# Patient Record
Sex: FEMALE | Race: WHITE | Employment: FULL TIME | URBAN - METROPOLITAN AREA
[De-identification: names, ages, dates, MRNs, and addresses within clinical notes are randomized per-mention and may not be internally consistent; named-entity substitution may affect disease eponyms.]

---

## 2017-01-12 ENCOUNTER — TELEPHONE (OUTPATIENT)
Dept: FAMILY MEDICINE CLINIC | Age: 27
End: 2017-01-12

## 2017-01-12 DIAGNOSIS — F17.200 SMOKING: ICD-10-CM

## 2017-01-12 DIAGNOSIS — F41.8 DEPRESSION WITH ANXIETY: ICD-10-CM

## 2017-01-12 DIAGNOSIS — F31.4 BIPOLAR DISORDER, CURRENT EPISODE DEPRESSED, SEVERE, WITHOUT PSYCHOTIC FEATURES (HCC): ICD-10-CM

## 2017-01-12 RX ORDER — TRAZODONE HYDROCHLORIDE 50 MG/1
50 TABLET ORAL NIGHTLY PRN
Qty: 30 TABLET | Refills: 5 | Status: ON HOLD | OUTPATIENT
Start: 2017-01-12 | End: 2017-06-29 | Stop reason: HOSPADM

## 2017-01-12 RX ORDER — BUPROPION HYDROCHLORIDE 150 MG/1
150 TABLET, EXTENDED RELEASE ORAL 2 TIMES DAILY
Qty: 60 TABLET | Refills: 5 | Status: ON HOLD | OUTPATIENT
Start: 2017-01-12 | End: 2017-06-29 | Stop reason: HOSPADM

## 2017-01-12 RX ORDER — PAROXETINE HYDROCHLORIDE 20 MG/1
20 TABLET, FILM COATED ORAL DAILY
Qty: 30 TABLET | Refills: 5 | Status: ON HOLD | OUTPATIENT
Start: 2017-01-12 | End: 2017-06-29 | Stop reason: HOSPADM

## 2017-01-12 RX ORDER — QUETIAPINE FUMARATE 50 MG/1
50 TABLET, FILM COATED ORAL NIGHTLY
Qty: 30 TABLET | Refills: 5 | Status: ON HOLD | OUTPATIENT
Start: 2017-01-12 | End: 2017-06-29 | Stop reason: HOSPADM

## 2017-01-12 RX ORDER — LAMOTRIGINE 25 MG/1
25 TABLET ORAL DAILY
Qty: 30 TABLET | Refills: 5 | Status: ON HOLD | OUTPATIENT
Start: 2017-01-12 | End: 2017-06-29 | Stop reason: HOSPADM

## 2017-07-03 ENCOUNTER — TELEPHONE (OUTPATIENT)
Dept: FAMILY MEDICINE CLINIC | Age: 27
End: 2017-07-03

## 2017-07-05 ENCOUNTER — TELEPHONE (OUTPATIENT)
Dept: FAMILY MEDICINE CLINIC | Age: 27
End: 2017-07-05

## 2017-11-08 ENCOUNTER — OFFICE VISIT (OUTPATIENT)
Dept: FAMILY MEDICINE CLINIC | Age: 27
End: 2017-11-08
Payer: MEDICAID

## 2017-11-08 VITALS
RESPIRATION RATE: 16 BRPM | SYSTOLIC BLOOD PRESSURE: 122 MMHG | BODY MASS INDEX: 40.05 KG/M2 | HEART RATE: 80 BPM | WEIGHT: 234.6 LBS | DIASTOLIC BLOOD PRESSURE: 76 MMHG | HEIGHT: 64 IN

## 2017-11-08 DIAGNOSIS — F41.8 DEPRESSION WITH ANXIETY: Primary | ICD-10-CM

## 2017-11-08 PROCEDURE — 1036F TOBACCO NON-USER: CPT | Performed by: NURSE PRACTITIONER

## 2017-11-08 PROCEDURE — 99213 OFFICE O/P EST LOW 20 MIN: CPT | Performed by: NURSE PRACTITIONER

## 2017-11-08 PROCEDURE — G8417 CALC BMI ABV UP PARAM F/U: HCPCS | Performed by: NURSE PRACTITIONER

## 2017-11-08 PROCEDURE — G8427 DOCREV CUR MEDS BY ELIG CLIN: HCPCS | Performed by: NURSE PRACTITIONER

## 2017-11-08 PROCEDURE — G8484 FLU IMMUNIZE NO ADMIN: HCPCS | Performed by: NURSE PRACTITIONER

## 2017-11-08 NOTE — PROGRESS NOTES
Subjective:      Patient ID: Yasmani Ni is a 24 y.o. female. HPI: Acute for mood    Chief Complaint   Patient presents with    Mood Swings     bipolar, needs to restart medications       Patient is postpartum 1 week. Hx of Bipolar. Stable on Lamictal, Seroquel and Trazodone. Was taken off when she was pregnant - had withdrawal and was admitted to [facility] floor due to suicidal ideation. Was placed on Prozac for pregnancy but did not continued    Presents today feeling pretty good even with . This is her first child. Not . She will be moving to [facility] in coming weeks for new job. Will be going back to work soon. Family and boyfriend (infants father) will be close by. Not following with [facility]    Would like to get pregnant again so does not want treatment she will have to come off of. Patient Active Problem List   Diagnosis    PCOS (polycystic ovarian syndrome)    Depression with anxiety    Bipolar affective disorder, current episode depressed ([facility]    Episode of recurrent major depressive disorder ([facility]    Affective psychosis, bipolar ([facility]         Review of Systems   Constitutional: Negative for chills and fever. HENT: Negative. Respiratory: Negative for cough and shortness of breath. Cardiovascular: Negative for chest pain. Gastrointestinal: Negative for abdominal pain and nausea. Skin: Negative for rash. Neurological: Negative for dizziness, light-headedness and headaches. Psychiatric/Behavioral: Positive for behavioral problems and dysphoric mood. Negative for self-injury, sleep disturbance and suicidal ideas. The patient is nervous/anxious. Objective:   Physical Exam   Constitutional: She is oriented to person, place, and time. Vital signs are normal. She appears well-developed and well-nourished. She is active. She does not have a sickly appearance. No distress.    HENT:   Right Ear: Tympanic membrane normal.   Left Ear: Tympanic membrane normal. Nose: Nose normal.   Mouth/Throat: Oropharynx is clear and moist and mucous membranes are normal.   Cardiovascular: Normal rate, regular rhythm, S1 normal, S2 normal, normal heart sounds and normal pulses. Exam reveals no S3. No murmur heard. Pulmonary/Chest: Effort normal and breath sounds normal. She has no decreased breath sounds. She has no wheezes. She has no rhonchi. Abdominal: Soft. Bowel sounds are normal. There is no tenderness. Neurological: She is alert and oriented to person, place, and time. Psychiatric: Her speech is normal and behavior is normal. Thought content normal. Her mood appears anxious. She is not agitated, not slowed and not withdrawn. Cognition and memory are not impaired. She expresses impulsivity. She does not exhibit a depressed mood. Assessment:      1. Depression with anxiety  sertraline (ZOLOFT) 50 MG tablet   2.  Postpartum state  sertraline (ZOLOFT) 50 MG tablet           Plan:      Discussion on tx - discouraged PRN use especially with   Even though patient doing well now - concern relayed with moving, new job, hx and postpartum period puts her at high risk  Zoloft 50 mg  RTO in 1 month

## 2017-11-09 ASSESSMENT — ENCOUNTER SYMPTOMS
ABDOMINAL PAIN: 0
COUGH: 0
SHORTNESS OF BREATH: 0
NAUSEA: 0

## 2018-09-17 ENCOUNTER — TELEPHONE (OUTPATIENT)
Dept: FAMILY MEDICINE CLINIC | Age: 28
End: 2018-09-17

## 2018-09-17 NOTE — TELEPHONE ENCOUNTER
Patient presented at window of office. No openings. Recommended urgent care. Patient voiced understanding.

## 2018-09-17 NOTE — TELEPHONE ENCOUNTER
09/17/2018 Patient called office asking to be seen today. Yesterday felt like she was going to pass out. Nauseated,  Called into work this morning because she felt like she was going to pass out again. Patient does not know if she has vertigo or what. Please advise patient at [phone]. da

## 2018-10-15 ENCOUNTER — OFFICE VISIT (OUTPATIENT)
Dept: FAMILY MEDICINE CLINIC | Age: 28
End: 2018-10-15
Payer: COMMERCIAL

## 2018-10-15 VITALS
WEIGHT: 238 LBS | OXYGEN SATURATION: 99 % | HEART RATE: 67 BPM | DIASTOLIC BLOOD PRESSURE: 88 MMHG | HEIGHT: 64 IN | BODY MASS INDEX: 40.63 KG/M2 | SYSTOLIC BLOOD PRESSURE: 132 MMHG | RESPIRATION RATE: 16 BRPM

## 2018-10-15 DIAGNOSIS — T75.4XXA ELECTROCUTION AND NONFATAL EFFECTS OF ELECTRIC CURRENT, INITIAL ENCOUNTER: Primary | ICD-10-CM

## 2018-10-15 DIAGNOSIS — M79.10 MUSCLE PAIN: ICD-10-CM

## 2018-10-15 PROCEDURE — G8484 FLU IMMUNIZE NO ADMIN: HCPCS | Performed by: NURSE PRACTITIONER

## 2018-10-15 PROCEDURE — 99213 OFFICE O/P EST LOW 20 MIN: CPT | Performed by: NURSE PRACTITIONER

## 2018-10-15 PROCEDURE — 4004F PT TOBACCO SCREEN RCVD TLK: CPT | Performed by: NURSE PRACTITIONER

## 2018-10-15 PROCEDURE — G8417 CALC BMI ABV UP PARAM F/U: HCPCS | Performed by: NURSE PRACTITIONER

## 2018-10-15 PROCEDURE — G8427 DOCREV CUR MEDS BY ELIG CLIN: HCPCS | Performed by: NURSE PRACTITIONER

## 2018-10-15 RX ORDER — TIZANIDINE 2 MG/1
2 TABLET ORAL EVERY 8 HOURS PRN
Qty: 30 TABLET | Refills: 0 | Status: SHIPPED | OUTPATIENT
Start: 2018-10-15 | End: 2019-06-17

## 2018-10-15 RX ORDER — GABAPENTIN 100 MG/1
100 CAPSULE ORAL 3 TIMES DAILY PRN
Qty: 30 CAPSULE | Refills: 0 | Status: SHIPPED | OUTPATIENT
Start: 2018-10-15 | End: 2019-06-17

## 2018-10-15 ASSESSMENT — PATIENT HEALTH QUESTIONNAIRE - PHQ9
1. LITTLE INTEREST OR PLEASURE IN DOING THINGS: 0
SUM OF ALL RESPONSES TO PHQ9 QUESTIONS 1 & 2: 0
SUM OF ALL RESPONSES TO PHQ QUESTIONS 1-9: 0
SUM OF ALL RESPONSES TO PHQ QUESTIONS 1-9: 0
2. FEELING DOWN, DEPRESSED OR HOPELESS: 0

## 2018-10-15 NOTE — LETTER
[address] [place] [address].  [address] [facility] [place] [address]  Phone: [phone]  Fax: [phone]    JAMESON Crenshaw CNP        October 15, 2018     Patient: Andres Valenzuela   YOB: 1990   Date of Visit: 10/15/2018       To Whom it May Concern:    Ed Hollingsworth was seen in my clinic on 10/15/2018. She may return to work on 10/16/18. If you have any questions or concerns, please don't hesitate to call.     Sincerely,         JAMESON Crenshaw CNP

## 2018-10-15 NOTE — PATIENT INSTRUCTIONS
respiratory therapist.  The four appointments span over three weeks. The respiratory therapist schedules one of the appointments to occur 48 hours after the patients quit date.  Cost $100 total for the four sessions.   Tobacco cessation products are not included in the cost and are not provided by [facility].     Marylu Hamman

## 2018-10-16 ENCOUNTER — TELEPHONE (OUTPATIENT)
Dept: FAMILY MEDICINE CLINIC | Age: 28
End: 2018-10-16

## 2018-10-16 ASSESSMENT — ENCOUNTER SYMPTOMS
SHORTNESS OF BREATH: 0
NAUSEA: 0
ABDOMINAL PAIN: 0
COUGH: 0

## 2019-06-17 ENCOUNTER — TELEPHONE (OUTPATIENT)
Dept: FAMILY MEDICINE CLINIC | Age: 29
End: 2019-06-17

## 2019-06-17 ENCOUNTER — HOSPITAL ENCOUNTER (INPATIENT)
Age: 29
LOS: 4 days | Discharge: HOME | DRG: 885 | End: 2019-06-21
Attending: FAMILY MEDICINE | Admitting: PSYCHIATRY & NEUROLOGY
Payer: COMMERCIAL

## 2019-06-17 DIAGNOSIS — F33.9 EPISODE OF RECURRENT MAJOR DEPRESSIVE DISORDER, UNSPECIFIED DEPRESSION EPISODE SEVERITY (HCC): Primary | ICD-10-CM

## 2019-06-17 PROBLEM — F32.9 MAJOR DEPRESSIVE DISORDER WITHOUT PSYCHOTIC FEATURES: Status: ACTIVE | Noted: 2019-06-17

## 2019-06-17 LAB
ACETAMINOPHEN LEVEL: < 5 UG/ML (ref 0–20)
AMPHETAMINE+METHAMPHETAMINE URINE SCREEN: NEGATIVE
ANION GAP SERPL CALCULATED.3IONS-SCNC: 13 MEQ/L (ref 8–16)
BARBITURATE QUANTITATIVE URINE: NEGATIVE
BASOPHILS # BLD: 0.5 %
BASOPHILS ABSOLUTE: 0 THOU/MM3 (ref 0–0.1)
BENZODIAZEPINE QUANTITATIVE URINE: NEGATIVE
BUN BLDV-MCNC: 13 MG/DL (ref 7–22)
CALCIUM SERPL-MCNC: 9.1 MG/DL (ref 8.5–10.5)
CANNABINOID QUANTITATIVE URINE: NEGATIVE
CHLORIDE BLD-SCNC: 105 MEQ/L (ref 98–111)
CO2: 20 MEQ/L (ref 23–33)
COCAINE METABOLITE QUANTITATIVE URINE: NEGATIVE
CREAT SERPL-MCNC: 0.7 MG/DL (ref 0.4–1.2)
EOSINOPHIL # BLD: 1.6 %
EOSINOPHILS ABSOLUTE: 0.1 THOU/MM3 (ref 0–0.4)
ERYTHROCYTE [DISTWIDTH] IN BLOOD BY AUTOMATED COUNT: 12.7 % (ref 11.5–14.5)
ERYTHROCYTE [DISTWIDTH] IN BLOOD BY AUTOMATED COUNT: 41.9 FL (ref 35–45)
ETHYL ALCOHOL, SERUM: < 0.01 %
GFR SERPL CREATININE-BSD FRML MDRD: > 90 ML/MIN/1.73M2
GLUCOSE BLD-MCNC: 102 MG/DL (ref 70–108)
HCT VFR BLD CALC: 42.1 % (ref 37–47)
HEMOGLOBIN: 13.9 GM/DL (ref 12–16)
IMMATURE GRANS (ABS): 0.02 THOU/MM3 (ref 0–0.07)
IMMATURE GRANULOCYTES: 0.4 %
LYMPHOCYTES # BLD: 27.5 %
LYMPHOCYTES ABSOLUTE: 1.5 THOU/MM3 (ref 1–4.8)
MCH RBC QN AUTO: 30.1 PG (ref 26–33)
MCHC RBC AUTO-ENTMCNC: 33 GM/DL (ref 32.2–35.5)
MCV RBC AUTO: 91.1 FL (ref 81–99)
MONOCYTES # BLD: 16.7 %
MONOCYTES ABSOLUTE: 0.9 THOU/MM3 (ref 0.4–1.3)
NUCLEATED RED BLOOD CELLS: 0 /100 WBC
OPIATES, URINE: NEGATIVE
OSMOLALITY CALCULATION: 276 MOSMOL/KG (ref 275–300)
OXYCODONE: NEGATIVE
PHENCYCLIDINE QUANTITATIVE URINE: NEGATIVE
PLATELET # BLD: 256 THOU/MM3 (ref 130–400)
PMV BLD AUTO: 9.3 FL (ref 9.4–12.4)
POTASSIUM SERPL-SCNC: 4.3 MEQ/L (ref 3.5–5.2)
PREGNANCY, SERUM: NEGATIVE
RBC # BLD: 4.62 MILL/MM3 (ref 4.2–5.4)
SALICYLATE, SERUM: < 0.3 MG/DL (ref 2–10)
SEG NEUTROPHILS: 53.3 %
SEGMENTED NEUTROPHILS ABSOLUTE COUNT: 3 THOU/MM3 (ref 1.8–7.7)
SODIUM BLD-SCNC: 138 MEQ/L (ref 135–145)
TSH SERPL DL<=0.05 MIU/L-ACNC: 1.38 UIU/ML (ref 0.4–4.2)
WBC # BLD: 5.6 THOU/MM3 (ref 4.8–10.8)

## 2019-06-17 PROCEDURE — 99285 EMERGENCY DEPT VISIT HI MDM: CPT

## 2019-06-17 PROCEDURE — 36415 COLL VENOUS BLD VENIPUNCTURE: CPT

## 2019-06-17 PROCEDURE — 80048 BASIC METABOLIC PNL TOTAL CA: CPT

## 2019-06-17 PROCEDURE — 6370000000 HC RX 637 (ALT 250 FOR IP): Performed by: PSYCHIATRY & NEUROLOGY

## 2019-06-17 PROCEDURE — 1240000000 HC EMOTIONAL WELLNESS R&B

## 2019-06-17 PROCEDURE — 84443 ASSAY THYROID STIM HORMONE: CPT

## 2019-06-17 PROCEDURE — 84703 CHORIONIC GONADOTROPIN ASSAY: CPT

## 2019-06-17 PROCEDURE — G0480 DRUG TEST DEF 1-7 CLASSES: HCPCS

## 2019-06-17 PROCEDURE — 80307 DRUG TEST PRSMV CHEM ANLYZR: CPT

## 2019-06-17 PROCEDURE — 85025 COMPLETE CBC W/AUTO DIFF WBC: CPT

## 2019-06-17 RX ORDER — HYDROXYZINE HYDROCHLORIDE 25 MG/1
25 TABLET, FILM COATED ORAL ONCE
Status: DISCONTINUED | OUTPATIENT
Start: 2019-06-17 | End: 2019-06-21 | Stop reason: HOSPADM

## 2019-06-17 RX ORDER — ACETAMINOPHEN 325 MG/1
650 TABLET ORAL EVERY 4 HOURS PRN
Status: DISCONTINUED | OUTPATIENT
Start: 2019-06-17 | End: 2019-06-21 | Stop reason: HOSPADM

## 2019-06-17 RX ORDER — TRAZODONE HYDROCHLORIDE 50 MG/1
50 TABLET ORAL NIGHTLY PRN
Status: DISCONTINUED | OUTPATIENT
Start: 2019-06-17 | End: 2019-06-21 | Stop reason: HOSPADM

## 2019-06-17 RX ORDER — HYDROXYZINE HYDROCHLORIDE 25 MG/1
25 TABLET, FILM COATED ORAL 3 TIMES DAILY PRN
Status: DISCONTINUED | OUTPATIENT
Start: 2019-06-17 | End: 2019-06-19

## 2019-06-17 RX ADMIN — TRAZODONE HYDROCHLORIDE 50 MG: 50 TABLET ORAL at 20:23

## 2019-06-17 RX ADMIN — HYDROXYZINE HYDROCHLORIDE 25 MG: 25 TABLET, FILM COATED ORAL at 20:23

## 2019-06-17 ASSESSMENT — SLEEP AND FATIGUE QUESTIONNAIRES
AVERAGE NUMBER OF SLEEP HOURS: 3
RESTFUL SLEEP: NO
SLEEP PATTERN: DIFFICULTY FALLING ASLEEP
DIFFICULTY STAYING ASLEEP: YES
AVERAGE NUMBER OF SLEEP HOURS: 3
DIFFICULTY FALLING ASLEEP: YES
DO YOU USE A SLEEP AID: NO
DIFFICULTY ARISING: NO
DIFFICULTY ARISING: NO
RESTFUL SLEEP: NO
DIFFICULTY FALLING ASLEEP: YES
DO YOU HAVE DIFFICULTY SLEEPING: YES
DO YOU USE A SLEEP AID: NO
SLEEP PATTERN: DIFFICULTY FALLING ASLEEP;DISTURBED/INTERRUPTED SLEEP
DO YOU HAVE DIFFICULTY SLEEPING: YES
DIFFICULTY STAYING ASLEEP: NO

## 2019-06-17 ASSESSMENT — LIFESTYLE VARIABLES
HISTORY_ALCOHOL_USE: YES
HISTORY_ALCOHOL_USE: NO

## 2019-06-17 ASSESSMENT — PATIENT HEALTH QUESTIONNAIRE - PHQ9
SUM OF ALL RESPONSES TO PHQ QUESTIONS 1-9: 18
SUM OF ALL RESPONSES TO PHQ QUESTIONS 1-9: 18

## 2019-06-17 ASSESSMENT — ENCOUNTER SYMPTOMS
VOMITING: 0
DIARRHEA: 0
SORE THROAT: 0
ABDOMINAL PAIN: 0
WHEEZING: 0
NAUSEA: 0
COUGH: 0
EYE PAIN: 0
SHORTNESS OF BREATH: 0
EYE DISCHARGE: 0
RHINORRHEA: 0
BACK PAIN: 0

## 2019-06-17 ASSESSMENT — PAIN SCALES - GENERAL: PAINLEVEL_OUTOF10: 0

## 2019-06-17 NOTE — ED NOTES
Patient offered food and drink. Patient has no questions or concerns. Call light in reach.      Camilla Phelps RN  06/17/19 [id number]

## 2019-06-17 NOTE — ED PROVIDER NOTES
[facility]  eMERGENCY dEPARTMENT eNCOUnter          CHIEF COMPLAINT       Chief Complaint   Patient presents with   [address] Problem    Suicidal       Nurses Notes reviewed and I agree except as noted in the HPI. HISTORY OF Edgar Galicia is a 34 y.o. female who presents to the Emergency Department with a history of bipolar disorder and type 1 manic behavior for mental health evaluation. Patient states that she has been treated for her bipolar disorder in the past. She states that she has been on and off of her medication for the past year in a half but has been completely off for the past month. She explains that within the last month her job has increased in stress and she is struggling managing her job with her personal life. Patient states that she is a single mother to a toddler son who she wants to be here for but needs to get her mental health taken care of. She reports suicidal ideation with a plan to OD on medication. She states she wants to fall asleep and stay asleep. She explains that she has tried [facility], PCP, physician at work, and called another psychiatrist 5 times without response or help. Her last suicidal attempt was November 2016 by trying to OD. Patient requests to be placed back on her medication to help manage her mental health. No further complaints or concerns at initial evaluation. The HPI was provided by the patient. REVIEW OF SYSTEMS     Review of Systems   Constitutional: Negative for appetite change, chills, fatigue and fever. HENT: Negative for congestion, ear pain, rhinorrhea and sore throat. Eyes: Negative for pain, discharge and visual disturbance. Respiratory: Negative for cough, shortness of breath and wheezing. Cardiovascular: Negative for chest pain, palpitations and leg swelling. Gastrointestinal: Negative for abdominal pain, diarrhea, nausea and vomiting.    Genitourinary: Negative for difficulty urinating, dysuria, hematuria and vaginal discharge. Musculoskeletal: Negative for arthralgias, back pain, joint swelling and neck pain. Skin: Negative for pallor and rash. Neurological: Negative for dizziness, syncope, weakness, light-headedness, numbness and headaches. Hematological: Negative for adenopathy. Psychiatric/Behavioral: Positive for dysphoric mood and suicidal ideas. Negative for confusion. The patient is not nervous/anxious. PAST MEDICALHISTORY    has a past medical history of Bipolar disorder ([facility] [address] Depression, Drug abuse ([facility] [address] [facility] Hypertension, Manic behavior ([facility] [address] [facility] and PCOS (polycystic ovarian syndrome). SURGICAL HISTORY    has a past surgical history that includes lymph node dissection and skin biopsy. CURRENT MEDICATIONS       Current Discharge Medication List          ALLERGIES     is allergic to buspar [buspirone]; latuda [lurasidone hcl]; and zofran [ondansetron hcl]. FAMILY HISTORY     indicated that her mother is alive. She indicated that her father is alive. She indicated that her sister is alive. She indicated that her brother is alive. She indicated that her maternal grandmother is alive. She indicated that her maternal grandfather is alive. She indicated that her paternal grandmother is alive. She indicated that her paternal grandfather is alive. family history includes Diabetes in her paternal grandfather and paternal grandmother; High Blood Pressure in her brother, father, maternal grandfather, maternal grandmother, mother, paternal grandfather, and paternal grandmother. SOCIAL HISTORY      reports that she quit smoking about 2 years ago. She started smoking about 10 years ago. She quit after 7.00 years of use. She has never used smokeless tobacco. She reports that she drinks alcohol. She reports that she does not use drugs. PHYSICAL EXAM     INITIAL VITALS:  height is 5' 4\" (1.626 m) and weight is 240 lb (108.9 kg). Her oral temperature is 98 °F (36.7 °C).  Her blood pressure is 124/77 and her pulse is 76. Her respiration is 16 and oxygen saturation is 98%. Physical Exam   Constitutional: She is oriented to person, place, and time. She appears well-developed and well-nourished. HENT:   Head: Normocephalic and atraumatic. Right Ear: External ear normal.   Left Ear: External ear normal.   Eyes: Conjunctivae are normal. Right eye exhibits no discharge. Left eye exhibits no discharge. No scleral icterus. Neck: Normal range of motion. Neck supple. No JVD present. Pulmonary/Chest: Effort normal. No stridor. No respiratory distress. Abdominal: Soft. She exhibits no distension. Musculoskeletal: Normal range of motion. She exhibits no edema. Neurological: She is alert and oriented to person, place, and time. She exhibits normal muscle tone. GCS eye subscore is 4. GCS verbal subscore is 5. GCS motor subscore is 6. Skin: Skin is warm and dry. She is not diaphoretic. No erythema. Psychiatric: She has a normal mood and affect. Her behavior is normal.   Nursing note and vitals reviewed.       DIFFERENTIALDIAGNOSIS:   Bipolar disorder, suicidal ideation, depression, anxiety    DIAGNOSTIC RESULTS     EKG: All EKG's are interpreted by the Emergency Department Physician who either signs or Co-signs this chart in the absence of a cardiologist.  EKG interpreted by Scott Dias MD:    None    RADIOLOGY: non-plain film images(s) such as CT, Ultrasound and MRI are read by the radiologist.    No orders to display       LABS:   Labs Reviewed   CBC WITH AUTO DIFFERENTIAL - Abnormal; Notable for the following components:       Result Value    MPV 9.3 (*)     All other components within normal limits   BASIC METABOLIC PANEL - Abnormal; Notable for the following components:    CO2 20 (*)     All other components within normal limits   SALICYLATE LEVEL - Abnormal; Notable for the following components:    Salicylate, Serum < 0.3 (*)     All other components within normal limits TSH WITH REFLEX   URINE DRUG SCREEN   ETHANOL   ACETAMINOPHEN LEVEL   HCG, SERUM, QUALITATIVE   ANION GAP   OSMOLALITY   GLOMERULAR FILTRATION RATE, ESTIMATED       EMERGENCY DEPARTMENT COURSE:   Vitals:    Vitals:    06/17/19 1400 06/17/19 1752 06/17/19 1930 06/18/19 0800   BP: (!) 144/89 119/81 122/88 124/77   Pulse: 95 84 78 76   Resp: 17 16 16 16   Temp: 98.2 °F (36.8 °C) 98.4 °F (36.9 °C) 98.1 °F (36.7 °C) 98 °F (36.7 °C)   TempSrc: Oral Oral Oral Oral   SpO2: 98% 96% 100% 98%   Weight:  240 lb (108.9 kg)     Height:  5' 4\" (1.626 m)         2:07 PM: The patient was seen and evaluated in a timely fashion. MDM:  The patient was seen and evaluated in the ED today following mental health evaluation. Within the department I observed the patient's vital signs to show normal ranges. On exam, I appreciated heart and lungs clear to ascultation. Abdomen soft and non-tender. Laboratory results showed negative drug screen. I consulted Dr. Vira Cobb (Psychiatry) who graciously agreed to admit the patient. Within the department, the patient was treated with Atarax. I explained my proposed course of treatment to the patient, who was amenable to my decision, and I answered all questions that were asked. The patient was then admitted under Dr. Vira Cobb (Psychiatry).      CRITICAL CARE:   None     CONSULTS:  JENNIFER    PROCEDURES:  None     FINAL IMPRESSION      1. Episode of recurrent major depressive disorder, unspecified depression episode severity ([facility] [address]          DISPOSITION/PLAN   Admit    PATIENT REFERRED TO:  Yelitza North, APRN - CNP  [facility] [address]  [phone]            DISCHARGE MEDICATIONS:  Current Discharge Medication List          (Please note that portions of this note were completed with a voice recognition program.Efforts were made to edit thedictations but occasionally words are mis-transcribed.)    Scribe:  Bo Cruz 6/17/19 2:07 PM Scribing forand in the presence Mely Henderson MD.    Signed by: Cady Cota, 06/18/19 12:15 PM    Provider:  I personally performed the services described in the documentation, reviewed and edited the documentation which was dictated to the scribe in my presence, and it accurately records mywords and actions.     Shonda Hernandez MD 6/17/19 12:15 PM                  Shonda Hernandez MD  06/18/19 2296

## 2019-06-17 NOTE — PROGRESS NOTES
Provisional Diagnosis:   Unspecified Depressive Disorder      Risk, Psychosocial and Contextual Factors:  Pt seeking to be placed on psychotropic medication to assist in stabilization     Current MH Treatment:  Denies      Present Suicidal Behavior:      Verbal:   X        Attempt:            Denies     Access to Weapons:   Denies     Current Suicide Risk: Low, Moderate or High:    High     Past Suicidal Behavior:       Verbal:  X    Attempt:  History of suicide attempt by overdose in 2017    Self-Injurious/Self-Mutilation:  Denies     Traumatic Event Within Past 2 Weeks:   Denies     Current Abuse:  Denies     Legal:  Denies     Violence:   Denies current violent behavior, see summary    Protective Factors:  Pts mother, aunt and son's grandmother are supportive     Housing:    Pt has stable housing     Clinical Summary:      Pt is a 34year old female presenting to [facility] voluntarily due to depression and suicidal thoughts. Pt report worsening depression and suicidal thoughts during the last 30 days. Pt report current suicidal thoughts with a plan to overdose on trazadone, pts child was her deterrent today. Pt report a history of being diagnosed with bipolar disorder, was prescribed psychotropic medication in the past, was taken off her medication while pregnant in 2017. Pt attempted to follow up with [facility] gave me the run-around\". Pts PCP did not prescribe her medication but instead referred her to [facility]. Pt has a history of cocaine and marijuana use however has been sober since 2017. Pt report occasional alcohol use. Pt works and lives with her son. Pt has a limited support system. Pt has a history of inpatient psychiatric treatment at [facility] on 11/30/16 and 6/27/17. Pt reportedly was upset while in the ED during the 6/27/17 admission, \"I threw things around cause they wouldn't let my boyfriend back, they medicated me and I slept for two days\". Pt denies current violent behavior.   Pt denies legal

## 2019-06-17 NOTE — TELEPHONE ENCOUNTER
Called and updated the patient, she stated that she went to the crisis center last time and was given the run around. The patient stated that she will go to the ED.

## 2019-06-17 NOTE — TELEPHONE ENCOUNTER
The patient called very tearful and anxious requesting an appt for her Bipolar. The patient states that she is crying all the time, not sleeping and is an emotional mess due to being off her medications. She stated that she is not able to get anywhere with [facility], she has called [facility] in [facility] and they are not calling her back. She stated that she has been off her psych meds for about a year and a half due to being pregnant and having her son. She states she can not do it anymore and does not want to go to the ED due to mik a single mom and has not place for her son. She is wanting to be seen but you do not have any openings today. She stated that she took any emergency day off today and will have to work tomorrow. Does not want to see another provider. Please advise.

## 2019-06-18 PROCEDURE — APPSS60 APP SPLIT SHARED TIME 46-60 MINUTES: Performed by: PHYSICIAN ASSISTANT

## 2019-06-18 PROCEDURE — 6370000000 HC RX 637 (ALT 250 FOR IP): Performed by: PSYCHIATRY & NEUROLOGY

## 2019-06-18 PROCEDURE — 90792 PSYCH DIAG EVAL W/MED SRVCS: CPT | Performed by: PSYCHIATRY & NEUROLOGY

## 2019-06-18 PROCEDURE — 6370000000 HC RX 637 (ALT 250 FOR IP): Performed by: PHYSICIAN ASSISTANT

## 2019-06-18 PROCEDURE — 1240000000 HC EMOTIONAL WELLNESS R&B

## 2019-06-18 RX ORDER — PAROXETINE HYDROCHLORIDE 20 MG/1
20 TABLET, FILM COATED ORAL DAILY
Status: DISCONTINUED | OUTPATIENT
Start: 2019-06-18 | End: 2019-06-21 | Stop reason: HOSPADM

## 2019-06-18 RX ORDER — QUETIAPINE FUMARATE 25 MG/1
50 TABLET, FILM COATED ORAL NIGHTLY
Status: DISCONTINUED | OUTPATIENT
Start: 2019-06-18 | End: 2019-06-19

## 2019-06-18 RX ADMIN — QUETIAPINE FUMARATE 50 MG: 25 TABLET ORAL at 21:18

## 2019-06-18 RX ADMIN — HYDROXYZINE HYDROCHLORIDE 25 MG: 25 TABLET, FILM COATED ORAL at 15:40

## 2019-06-18 RX ADMIN — HYDROXYZINE HYDROCHLORIDE 25 MG: 25 TABLET, FILM COATED ORAL at 21:18

## 2019-06-18 RX ADMIN — TRAZODONE HYDROCHLORIDE 50 MG: 50 TABLET ORAL at 21:18

## 2019-06-18 RX ADMIN — PAROXETINE HYDROCHLORIDE 20 MG: 20 TABLET, FILM COATED ORAL at 12:35

## 2019-06-18 ASSESSMENT — PAIN - FUNCTIONAL ASSESSMENT: PAIN_FUNCTIONAL_ASSESSMENT: ACTIVITIES ARE NOT PREVENTED

## 2019-06-18 ASSESSMENT — PAIN SCALES - GENERAL
PAINLEVEL_OUTOF10: 0
PAINLEVEL_OUTOF10: 0

## 2019-06-18 NOTE — PLAN OF CARE
Problem: Depressive Behavior With or Without Suicide Precautions:  Goal: Ability to disclose and discuss suicidal ideas will improve  Description  Ability to disclose and discuss suicidal ideas will improve  Outcome: Met This Shift  Note:   Pt denied any SI at this time  Goal: Absence of self-harm  Description  Absence of self-harm  Outcome: Met This Shift  Note:   No self harm behaviors were observed or reported so far this shift. Remains on every 15 minutes precautions for safety. Problem: Activity:  Goal: Sleeping patterns will improve  Description  Sleeping patterns will improve  Outcome: Met This Shift  Note:   Patient slept 8 hours last night, reports they slept well. Encourage patient not to take naps during the day, relax several hours before bed and to take prescribed sleep meds as ordered. Patient verbalized understanding. Problem: Anxiety:  Goal: Level of anxiety will decrease  Description  Level of anxiety will decrease  Outcome: Met This Shift  Note:   Pt denied any anxiety at this time     Problem: KNOWLEDGE DEFICIT,EDUCATION,DISCHARGE PLAN  Goal: Knowledge - personal safety  Outcome: Ongoing  Note:   Maintained in safe and secure environment. Problem: Depressive Behavior With or Without Suicide Precautions:  Goal: Able to verbalize and/or display a decrease in depressive symptoms  Description  Able to verbalize and/or display a decrease in depressive symptoms  Outcome: Ongoing  Note:   Pt said that she remains to have some depression   Goal: Able to verbalize support systems  Description  Able to verbalize support systems  Outcome: Ongoing  Note:   Patient reports family as their support system.    Goal: Patient specific goal  Description  Patient specific goal  Outcome: Ongoing  Note:   Patient reports goal for today is to talk to the Dr  Goal: Participates in care planning  Description  Participates in care planning  Outcome: Ongoing  Note:   Patient discussed treatment plan with physician/medical staff, attending group, and compliant with medications. Problem: Discharge Planning:  Goal: Discharged to appropriate level of care  Description  Discharged to appropriate level of care  Outcome: Ongoing  Note:   Patient voices no needs before discharge. Patient plans to be discharged to own home. Discharge planner working with patient to achieve optimal discharge plans, specific to individual needs.

## 2019-06-18 NOTE — PLAN OF CARE
suicidal ideas will improve  Outcome: Not Met This Shift  Note:   States that she is suicidal and would overdose on her medications if she were at home.

## 2019-06-18 NOTE — PROGRESS NOTES
[facility] Biopsychosocial Assessment    Current Level of Psychosocial Functioning     Independent XXX  Dependent    Minimal Assist     Comments:      Psychosocial High Risk Factors (check all that apply)    Unable to obtain meds   Chronic illness/pain    Substance abuse XXX - history   Lack of Family Support   Financial stress   Isolation   Inadequate Community Resources XXX  Suicide attempt(s) XXX   Not taking medications   Victim of crime   Developmental Delay  Unable to manage personal needs    Age 72 or older   Homeless  No transportation   Readmission within 30 days  Unemployment  Traumatic Event    Comments:   Sexual Orientation:  Heterosexual     Patient Strengths: Communication, Motivated    Patient Barriers: Suicide Attempts, Poor Coping Skills, Off Medication, History of Substance Abuse    Plan of Care:   Medication management, group/individual therapies, family meetings, psycho -education, treatment team meetings to assist with stabilization    Initial Discharge Plan:  Patient is currently living with her son in [place]. Patient does not currently have an outpatient mental health provider. Has history with [employer], does not wish to reconnect with them at this time. Clinical Summary: This is a 34year old, female who is admitted to  for increased depression and suicidal ideation. Patient has history of Bipolar Disorder, this is her third psychiatric admission. Last admission was June 2017. Patient is currently residing with her son in [place]. Patient does not currently have outpatient mental health provider, has previously linked with [employer], does not wish to re-connect with them as \"they gave her the run around\". Patient admits to history of cocaine and marijuana use, has not used since 2017. Patient states that she drinks \"socially\".  Per epic history, patient has been on several different medications, Wellbutrin, Buspar, Lamictal, Seroquel, Trazodone, Paxil, Prozac, stopped medications while pregnant, patient also has history of medication non-compliance. SW will continue to discharge plan.      EARL Vázquez

## 2019-06-18 NOTE — PATIENT CARE CONFERENCE
[id number] [address]  Initial Interdisciplinary Treatment Plan NOTE    Review Date & Time: 6/18/19 1608    Patient was in treatment team    Admission Type:   Admission Type: Voluntary    Reason for admission:  Reason for Admission: depression      Estimated Length of Stay Update:  3-5 days  Estimated Discharge Date Update: 3-5 days    PATIENT STRENGTHS:  Patient Strengths Strengths: Positive Support  Patient Strengths and Limitations:   Addictive Behavior:Addictive Behavior  In the past 3 months, have you felt or has someone told you that you have a problem with:  : None  Do you have a history of Chemical Use?: No  Do you have a history of Alcohol Use?: No  Do you have a history of Street Drug Abuse?: Yes  Histroy of Prescripton Drug Abuse?: No  Medical Problems:  Past Medical History:   Diagnosis Date    Bipolar disorder ([facility] [address] [address]     states more manic    Depression     [email or web address]     Hypertension     Manic behavior ([email or web address]     PCOS (polycystic ovarian syndrome)        EDUCATION:   Learner Progress Toward Treatment Goals: Reviewed results and recommendations of this team, Reviewed group plan and strategies, Reviewed signs, symptoms and risk of self harm and violent behavior and Reviewed goals and plan of care    Method: Small group    Outcome: Verbalized understanding, Needs reinforcement and Refused Education    PATIENT GOALS: Medication management    PLAN/TREATMENT RECOMMENDATIONS UPDATE:   1. What is the most important thing we can help you with while you are here? - Medication management  2. Who is your support system?  - BREANNA - patient did not finish treatment team   3. Do you have follow-up providers? - No  4. Do you have the ability to pay for your medications? - Yes  5. Where will you be residing when you leave the hospital?  - Home  6. What is a phone # we may contact you at?   - See chart      GOALS UPDATE:   Time frame for Short-Term Goals: Daily    EARL Quijano

## 2019-06-18 NOTE — PROGRESS NOTES
Group Therapy Note    Date: 6/17/2019  Start Time: 2000  End Time:  2020    Type of Group: Wrap-Up/Relaxation    Patient's Goal:  No goal    Notes:  attended    Status After Intervention:  Unchanged    Participation Level:  Active Listener    Participation Quality: Appropriate      Speech:  normal      Thought Process/Content: Linear      Affective Functioning: Blunted      Mood: depressed      Level of consciousness:  Alert      Response to Learning: Able to verbalize current knowledge/experience      Endings: Suicidality    Modes of Intervention: Support      Discipline Responsible: Registered Nurse      Signature:  Carlos Eduardo Carter RN

## 2019-06-18 NOTE — H&P
Department of Psychiatry  Psychiatric Assessment   . CHIEF COMPLAINT: Suicidal ideations    HISTORY OF PRESENT ILLNESS:      Guillermo Champion is a 34 y.o. female with a history of bipolar disorder, off meds x 2 years, who is admitted for suicidal ideations. She endorses worsening depression and an inability to get in to see an OP provider since her pregnacy last year. She endorses struggling with significant depression, frequent crying, sadness, poor motivation and isolation, not wanting to leave the house. She is poorly motivated and vegetative, and has gained quite a bit of weight. She becomes frustrated and angry easily and punches walls at times. She carries a diagnosis of bipolar disorder, however this was made at the time when she was using alcohol and cocaine. Over the last 2 years of sobriety, she is not able to describe any manic or hypomanic episodes. Rather, her mood swings appear to be between normal and depressed, and are situational.  Associated life stressors include being a single parent and working full-time while baby's dad is incarcerated. They seem to have a tumultuous relationship. She denies euphoria, denies homicidal ideations, denies hallucinations. She enjoys being a mother and describes having developed a very strong bond with her son. She adamantly denies any current or past thoughts to harm her infant child. She is able to care for the child without becoming overly frustrated or physically harmful. She has appropriate respite when she needs a break. She has never felt like her child would be better off not being alive. She recalls having done well on Seroquel, Paxil, and Lamictal in the past, and hopes that these might be resumed.     PSYCHIATRIC HISTORY:      · Outpatient psychiatric provider:  Unable to establish with an OP provider  · Suicide attempts: yes  · Inpatient psychiatric admissions: yes    Past psychiatric medications includes:     Trazodone  Depakote, Lamictal  Seroquel, Latuda  Paxil    Adverse reactions from psychotropic medications:    Latuda = hallucinations  Buspar = vomiting      Psychiatric Review of Systems      ·    Obsessions and Compulsions: denies  ·    Dejah or Hypomania: substance induced  ·    Hallucinations: denies  ·    Panic Attacks:  denies   ·    Delusions:  deneis  ·    Phobias: denies       Medical Review of Systems:     Constitutional: Negative for appetite change, diaphoresis, fatigue and fever. HENT: Negative for congestion, sore throat and tinnitus. Eyes: Negative for visual disturbance. Respiratory: Negative for cough, shortness of breath and wheezing. Cardiovascular: Negative for chest pain and leg swelling. Gastrointestinal: Negative for nausea, vomiting, diarrhea. Negative for abdominal pain. Genitourinary: Negative for frequency. Musculoskeletal: Negative for arthralgias, myalgias and neck stiffness. Skin: Negative for puritis. Neurological: Negative for dizziness, weakness and headaches. All other systems reviewed and are negative. Past Medical History:        Diagnosis Date    Bipolar disorder ([facility]     states more manic    Depression     [facility]     Hypertension     [facility]     PCOS (polycystic ovarian syndrome)        Past Surgical History:        Procedure Laterality Date    LYMPH NODE DISSECTION      SKIN BIOPSY         Medications Prior to Admission:   No medications prior to admission. Allergies:  Buspar [buspirone];  [place] hcl]; and Zofran [ondansetron hcl]    Social History:     · RESIDENCE:  Lives in [address]: single   · CHILDREN: one son, toddler age   · [de-identified]: works full time   · SUBSTANCE ABUSE: history of alcohol and cocaine abuse, sober x 2 years  · PATIENT ASSETS: seeking help       Family Psychiatric and Medical History:         Problem Relation Age of Onset    High Blood Pressure Mother     High Blood Pressure Father     High Blood Pressure Brother     High Blood Pressure Maternal Grandmother     High Blood Pressure Maternal Grandfather     High Blood Pressure Paternal Grandmother     Diabetes Paternal Grandmother     High Blood Pressure Paternal Grandfather     Diabetes Paternal Grandfather          PHYSICAL EXAM:  Vitals:  /77   Pulse 76   Temp 98 °F (36.7 °C) (Oral)   Resp 16   Ht 5' 4\" (1.626 m)   Wt 240 lb (108.9 kg)   LMP 05/30/2019   SpO2 98%   BMI 41.20 kg/m²       Physical Exam:    Constitutional: Well developed, well nourished, no acute distress  Eyes: Pupils round and reactive to light bilaterally  Neck:  Supple, no thyromegaly. Cardiovascular:  Normal rate and rhythm, normal S1 and S2. No murmur or gallop on auscultation. Radial pulses 2+ and brisk bilaterally  Lungs: Clear to auscultation bilaterally without wheezing or rales. Musculoskeletal:  Full range of motion in all four extremities. Neurologic:  Cranial nerves II through XII are grossly intact. Normal gait and station.        Mental Status Examination:  Level of consciousness:  Within normal limits  Appearance:  Wearing hospital attire, seated in chair Fair grooming  Behavior/Motor: Tearful   attitude toward examiner:  cooperative  Speech: normal rate and volume  Mood:  depressed  Affect:  Full range  Thought processes:  Goal directed  Thought content: Passive suicidal ideations   denies homicidal ideations    denieshallucinations   denies delusions  Cognition:  Oriented to self, location, time, situation  Concentration clinically adequate  Memory: in tact  Insight & Judgment: fair    DSM-5 DIAGNOSIS:    Major depressive disorder, recurrent, severe, without psychotic features  Substance use disorders, cocaine and alcohol dependence, in sustained remission     Patient Active Problem List   Diagnosis    PCOS (polycystic ovarian syndrome)    Depression with anxiety    [facility] affective disorder, [facility] depressed ([facility]    Episode of recurrent major depressive disorder ([facility]    Affective psychosis, bipolar ([facility]    Major depressive disorder without psychotic features          Psychosocial and Contextual Factors:       Past Medical History:   Diagnosis Date    Bipolar disorder ([facility] [facility]     states more manic    Depression     Drug abuse ([facility]     Hypertension     Manic behavior ([facility]     PCOS (polycystic ovarian syndrome)           TREATMENT PLAN  Risk Management:  close watch per standard protocol  Psychotherapy:  participation in milieu and group and individual sessions with Attending Physician,  and Physician Assistant/CNP  Reason for Admission to Psychiatric Unit:  Threat to self  Estimated length of stay:  Greater than two midnights will be required to reach therapeutic levels of medications and to stabilize mood to where step down and management in a less restrictive environment is appropriate      GENERAL PATIENT/FAMILY EDUCATION  Patient will understand basic signs and symptoms, Patient will understand benefits/risks and potential side effects from proposed meds and Patient will understand their role in recovery. Family is  active in patient's care. Patient assets that may be helpful during treatment include: Intent to participate and engage in treatment, sufficient fund of knowledge and intellect to understand and utilize treatments. Goals:    Resume Seroquel and Paxil, titrate for efficacy  Will need referral for OP psychiatry as well as psychotherapy, consider DBT. Encouraged patient to engage in groups, milieu, and individual therapies offered as part of programing.      Behavioral Services  Medicare Certification Upon Admission    I certify that this patient's inpatient psychiatric hospital admission is medically necessary for:   X (1) Treatment which could reasonably be expected to improve this patient's condition,      X (2) Or for diagnostic study;     AND     X (2) The inpatient psychiatric services are provided while the individual is under the care of a physician and are included in the individualized plan of care. Estimated length of stay/service 2-10 days    Plan for post-hospital care: Follow up with [facility]. Pt also to be offered [id number] [facility],[address] [facility],[address] [facility] Intensive Outpatient Program.     Electronically signed by Perry Ivy PA-C on 6/18/2019 at 11:04 AM                                      Psychiatry Attending Attestation     I assessed this patient and [facility] I have reviewed the above documentation and I agree with the findings and treatment plan with the following updates. Patient is a 61-year-old single  female with history of bipolar disorder presented to the emergency department for worsening of suicidal ideation with a plan to kill self by overdosing on trazodone. Patient reports feeling down and low for more days than not. Endorses anhedonia. Patient notes her depression has been worsening for last 1 month. Reports dealing with constant suicidal thoughts . Patient identifies stressors as being off medications, no outpatient follow up. Patient reports poor sleep and appetite. Patient reports having trouble falling asleep. Patient reports poor concentration and energy. Patient endorses feelings of helplessness, hopelessness and worthlessness. SHe denies any psychotic symptoms. She denies any auditory or visual hallucinations. She denies any recent manic episodes. Assessment and Plan:  Bipolar disorder MRE depression  Patient reports doing well on combination of Lamictal, Seroquel, paxil and trazodone  Will restart meds slowly and titrate to effect. Patient understood and agreed to the plan. Will obtain more collateral information from family. Patient encouraged to participate in groups.   Case discussed with staff in the treatment team this morning. It might take more than 2 mid nights to stabilize her mood and titrate medications to effect. Electronically signed by Melinda Sherwood MD on 6/18/19 at 3:50 PM    **This report has been created using voice recognition software. It may contain minor errors which are inherent in voice recognition technology. **

## 2019-06-19 PROCEDURE — 90833 PSYTX W PT W E/M 30 MIN: CPT | Performed by: PSYCHIATRY & NEUROLOGY

## 2019-06-19 PROCEDURE — 99232 SBSQ HOSP IP/OBS MODERATE 35: CPT | Performed by: PSYCHIATRY & NEUROLOGY

## 2019-06-19 PROCEDURE — 6370000000 HC RX 637 (ALT 250 FOR IP): Performed by: PHYSICIAN ASSISTANT

## 2019-06-19 PROCEDURE — 6370000000 HC RX 637 (ALT 250 FOR IP): Performed by: PSYCHIATRY & NEUROLOGY

## 2019-06-19 PROCEDURE — 1240000000 HC EMOTIONAL WELLNESS R&B

## 2019-06-19 PROCEDURE — APPSS30 APP SPLIT SHARED TIME 16-30 MINUTES: Performed by: PHYSICIAN ASSISTANT

## 2019-06-19 RX ORDER — QUETIAPINE FUMARATE 100 MG/1
100 TABLET, FILM COATED ORAL NIGHTLY
Status: DISCONTINUED | OUTPATIENT
Start: 2019-06-19 | End: 2019-06-21 | Stop reason: HOSPADM

## 2019-06-19 RX ORDER — HYDROXYZINE HYDROCHLORIDE 25 MG/1
50 TABLET, FILM COATED ORAL 3 TIMES DAILY PRN
Status: DISCONTINUED | OUTPATIENT
Start: 2019-06-19 | End: 2019-06-21 | Stop reason: HOSPADM

## 2019-06-19 RX ADMIN — PAROXETINE HYDROCHLORIDE 20 MG: 20 TABLET, FILM COATED ORAL at 09:10

## 2019-06-19 RX ADMIN — TRAZODONE HYDROCHLORIDE 50 MG: 50 TABLET ORAL at 22:03

## 2019-06-19 RX ADMIN — QUETIAPINE FUMARATE 100 MG: 100 TABLET ORAL at 22:03

## 2019-06-19 RX ADMIN — HYDROXYZINE HYDROCHLORIDE 50 MG: 25 TABLET, FILM COATED ORAL at 13:50

## 2019-06-19 ASSESSMENT — PAIN SCALES - GENERAL: PAINLEVEL_OUTOF10: 0

## 2019-06-19 NOTE — PLAN OF CARE
Problem: KNOWLEDGE DEFICIT,EDUCATION,DISCHARGE PLAN  Goal: Knowledge - personal safety  6/19/2019 1058 by Swetha Sauceda LPN  Outcome: Ongoing  Note:   Patient did not complete personal safety plan   6/18/2019 2246 by Anders Garcia RN  Outcome: Ongoing  Note:   Patient maintained in a safe environment. 15 minute visual checks continued. Problem: Depressive Behavior With or Without Suicide Precautions:  Goal: Able to verbalize and/or display a decrease in depressive symptoms  Description  Able to verbalize and/or display a decrease in depressive symptoms  6/19/2019 1058 by Swetha Sauceda LPN  Outcome: Ongoing  Note:   Patient able to verbalize and display a decrease in depressive symptoms  6/18/2019 2246 by Anders Garcia RN  Outcome: Ongoing  Note:   Patient states she continues to feel depressed this shift. Goal: Ability to disclose and discuss suicidal ideas will improve  Description  Ability to disclose and discuss suicidal ideas will improve  6/19/2019 1058 by Swetha Sauceda LPN  Outcome: Ongoing  Note:   Patient able to disclose and discuss suicidal ideas will improve   6/18/2019 2246 by Anders Garcia RN  Outcome: Ongoing  Note:   Patient states suicidal thoughts this shift with no current plan. Patient contracts with staff for safety. Goal: Able to verbalize support systems  Description  Able to verbalize support systems  6/19/2019 1058 by Swetha Sauceda LPN  Outcome: Ongoing  Note:   Patient able to verbalize support system  6/18/2019 2246 by Anders Garcia RN  Outcome: Ongoing  Note:   Patient states her mother and father are her current support system. Goal: Absence of self-harm  Description  Absence of self-harm  6/19/2019 1058 by Swetha Sauceda LPN  Outcome: Ongoing  Note:   No self harm behaviors were observed or reported so far this shift. Remains on every 15 minutes precautions for safety.    6/18/2019 2246 by Terrie Phillips RN  Outcome: Ongoing  Note:   No self harm noted so far this shift. Goal: Patient specific goal  Description  Patient specific goal  6/19/2019 1058 by Rickey Vasquez LPN  Outcome: Ongoing  Note:   Patient stated specific goal   6/18/2019 2246 by Terrie Phillips RN  Outcome: Ongoing  Note:   Patient did not state a goal this shift. Goal: Participates in care planning  Description  Participates in care planning  6/19/2019 1058 by Rickey Vasquez LPN  Outcome: Ongoing  Note:   Patient participates in care planning   6/18/2019 2246 by Terrie Phillips RN  Outcome: Ongoing  Note:   Care plan reviewed with patient and fair understanding verbalized. Problem: Discharge Planning:  Goal: Discharged to appropriate level of care  Description  Discharged to appropriate level of care  6/19/2019 1058 by Rickey Vasquez LPN  Outcome: Ongoing  Note:   Discharge planner working with patient to achieve optimal discharge plan, specific to the needs of this patient. 6/18/2019 2246 by Terrie Phillips RN  Outcome: Ongoing  Note:   Patient states she plans to return home with her son at discharge. Problem: Activity:  Goal: Sleeping patterns will improve  Description  Sleeping patterns will improve  6/19/2019 1058 by Rickey Vasquez LPN  Outcome: Ongoing  Note:   Patient sleeping patterns will improve   6/18/2019 2246 by Terrie Phillips RN  Outcome: Ongoing  Note:   Patient states she feels she is sleeping well. Problem: Anxiety:  Goal: Level of anxiety will decrease  Description  Level of anxiety will decrease  6/19/2019 1058 by Rickey Vasquez LPN  Outcome: Ongoing  Note:   Patient level of anxiety will improve with medication  6/18/2019 2246 by Terrie Phillips RN  Outcome: Ongoing  Note:   Patient states she continues to feel moderately anxious this shift.

## 2019-06-19 NOTE — PROGRESS NOTES
Department of Psychiatry  Progress Note     Chief Complaint:  Major depressive disorder without psychotic features     SUBJECTIVE:      Lidia Manley is irritable today, frustrated with multiple little things and peers on the unit. Rates her mood at a 2/10 today. Got upset with feedback in group, walked out. Doesn't like her room mate's personality and uncleanliness. Family didn't bring her son yesterday and wont today. She fel the atarax did not help with her anxiety. OBJECTIVE      Medications  Current Facility-Administered Medications: PARoxetine (PAXIL) tablet 20 mg, 20 mg, Oral, Daily  QUEtiapine (SEROQUEL) tablet 50 mg, 50 mg, Oral, Nightly  hydrOXYzine (ATARAX) tablet 25 mg, 25 mg, Oral, Once  acetaminophen (TYLENOL) tablet 650 mg, 650 mg, Oral, Q4H PRN  hydrOXYzine (ATARAX) tablet 25 mg, 25 mg, Oral, TID PRN  traZODone (DESYREL) tablet 50 mg, 50 mg, Oral, Nightly PRN  magnesium hydroxide (MILK OF MAGNESIA) 400 MG/5ML suspension 30 mL, 30 mL, Oral, Daily PRN     Physical     height is 5' 4\" (1.626 m) and weight is 240 lb (108.9 kg). Her oral temperature is 98 °F (36.7 °C). Her blood pressure is 112/74 and her pulse is 84. Her respiration is 18 and oxygen saturation is 97%. Lab Results   Component Value Date    WBC 5.6 06/17/2019    HGB 13.9 06/17/2019    HCT 42.1 06/17/2019     06/17/2019    ALT 7 (L) 06/27/2017    AST 9 06/27/2017     06/17/2019    K 4.3 06/17/2019     06/17/2019    CREATININE 0.7 06/17/2019    BUN 13 06/17/2019    CO2 20 (L) 06/17/2019    TSH 1.380 06/17/2019    LABA1C 5.1 08/29/2016          Mental Status Examination:    Level of consciousness:  Within normal limits  Appearance: seated in chair, good grooming  Behavior/Motor: No abnormal movements, tics or mannerisms  Gait: no abnormalities noted. Attitude toward examiner: cooperative, attentive, good eye contact.   Speech:  Spontaneous, normal rate and volume  Mood: 4 (with ten being the very best mood and one being the very worst mood)   Affect: reactive  Thought processes: linear, goal directed, coherent  Thought content: Suicidal ideation:  denies         Homicidal ideations: denies            Hallucinations: denies         Delusions: denies  Cognition:  intact  Memory: in tact  Insight and Judgement are improving  Attention and concentration are improving      ASSESSMENT     Major depressive disorder without psychotic features     PLAN    Patient's symptoms are improved over all, still present and severe  Increase Seroquel and Atarax    Attempt to develop insight, psycho-education and supportive therapy conducted. Encouraged to utilize the opportunities available to learn to cope and manage frustration and subsequent reactions to stressors     Probable discharge: 2 days  Follow-up: will need referral to OP provider, ? [facility]    Electronically signed by Gopal Womack PA-C on 6/19/2019 at 10:56 AM Reviewed patient's current plan of care and vital signs with nursing staff. Psychiatry Attending Attestation     I assessed this patient and reviewed the case and [facility] of care [facility] I have reviewed the above documentation and I agree with the findings and treatment plan with the following updates. Juany Severino continues to endorse feelings of sad down and depression. She  reports that her mind was racing a lot last night that kept her up. She reports having trouble falling asleep and staying asleep. Patient was tearful during the interview today. She reports that her young son could not visit her yesterday evening and that has been bothering her. She continues to have fleeting suicidal thoughts with no intent or plan. Patient is tolerating the medication adjustments well. She denies any side effects from Paxil and Seroquel today. Discussed with her about increasing Seroquel tonight. She understood and agreed to the plan.   She also reports dealing with very high anxiety today. She reports poor energy and concentration. She reports having an anxiety attack during the group yesterday evening. Case discussed with staff and the treatment team this morning. Discussed with her about adding Lamictal tomorrow. She understood and agreed to the plan. More than 16 mins of the session was spent doing Emotion focused therapy. Session started at 10:30am and ended at 11:00am.     Electronically signed by Melinda Sherwood MD on 6/19/19 at 2:56 PM    **This report has been created using voice recognition software. It may contain minor errors which are inherent in voice recognition technology. **

## 2019-06-19 NOTE — PLAN OF CARE
Problem: Depressive Behavior With or Without Suicide Precautions:  Goal: Able to verbalize and/or display a decrease in depressive symptoms  Description  Able to verbalize and/or display a decrease in depressive symptoms  6/18/2019 2246 by Lorri Denson RN  Outcome: Ongoing  Note:   Patient states she continues to feel depressed this shift. 6/18/2019 1106 by Mihir Sousa RN  Outcome: Ongoing  Note:   Pt said that she remains to have some depression   Goal: Ability to disclose and discuss suicidal ideas will improve  Description  Ability to disclose and discuss suicidal ideas will improve  6/18/2019 2246 by Lorri Denson RN  Outcome: Ongoing  Note:   Patient states suicidal thoughts this shift with no current plan. Patient contracts with staff for safety. 6/18/2019 1106 by Mihir Sousa RN  Outcome: Met This Shift  Note:   Pt denied any SI at this time  Goal: Able to verbalize support systems  Description  Able to verbalize support systems  6/18/2019 2246 by Lorri Denson RN  Outcome: Ongoing  Note:   Patient states her mother and father are her current support system. 6/18/2019 1106 by Mihir Sousa RN  Outcome: Ongoing  Note:   Patient reports family as their support system. Goal: Absence of self-harm  Description  Absence of self-harm  6/18/2019 2246 by Lorri Denson RN  Outcome: Ongoing  Note:   No self harm noted so far this shift. 6/18/2019 1106 by Mihir Sousa RN  Outcome: Met This Shift  Note:   No self harm behaviors were observed or reported so far this shift. Remains on every 15 minutes precautions for safety. Goal: Patient specific goal  Description  Patient specific goal  6/18/2019 2246 by Lorri Denson RN  Outcome: Ongoing  Note:   Patient did not state a goal this shift.   6/18/2019 1106 by Mihir Sousa RN  Outcome: Ongoing  Note:   Patient reports goal for today is to talk to the Dr  Goal: Participates in care planning  Description  Participates in care planning  6/18/2019 2246 by Jim Alonso RN  Outcome: Ongoing  Note:   Care plan reviewed with patient and fair understanding verbalized. 6/18/2019 1106 by Smith Yang RN  Outcome: Ongoing  Note:   Patient discussed treatment plan with physician/medical staff, attending group, and compliant with medications. Problem: Discharge Planning:  Goal: Discharged to appropriate level of care  Description  Discharged to appropriate level of care  6/18/2019 2246 by Jim Alonso RN  Outcome: Ongoing  Note:   Patient states she plans to return home with her son at discharge. 6/18/2019 1106 by Smith Yang RN  Outcome: Ongoing  Note:   Patient voices no needs before discharge. Patient plans to be discharged to own home. Discharge planner working with patient to achieve optimal discharge plans, specific to individual needs. Problem: Activity:  Goal: Sleeping patterns will improve  Description  Sleeping patterns will improve  6/18/2019 2246 by Jim Alonso RN  Outcome: Ongoing  Note:   Patient states she feels she is sleeping well. 6/18/2019 1106 by Smith Yang RN  Outcome: Met This Shift  Note:   Patient slept 8 hours last night, reports they slept well. Encourage patient not to take naps during the day, relax several hours before bed and to take prescribed sleep meds as ordered. Patient verbalized understanding. Problem: Anxiety:  Goal: Level of anxiety will decrease  Description  Level of anxiety will decrease  6/18/2019 2246 by Jim Alonso RN  Outcome: Ongoing  Note:   Patient states she continues to feel moderately anxious this shift. 6/18/2019 1106 by Smith Yang RN  Outcome: Met This Shift  Note:   Pt denied any anxiety at this time     Problem: KNOWLEDGE DEFICIT,EDUCATION,DISCHARGE PLAN  Goal: Knowledge - personal safety  6/18/2019 2246 by Jim Alonso RN  Outcome: Ongoing  Note:   Patient maintained in a safe environment.  15 minute visual checks

## 2019-06-19 NOTE — PROGRESS NOTES
5 [facility]  Day 3 Interdisciplinary Treatment Plan NOTE    Review Date & Time: 06/19/19 1505    Patient was in treatment team    Admission Type:   Admission Type: Voluntary    Reason for admission:  Reason for Admission: depression  Estimated Length of Stay Update:  06/24/19  Estimated Discharge Date Update: 06/22/19    PATIENT STRENGTHS:  Patient Strengths Strengths: Positive Support  Patient Strengths and Limitations:   Addictive Behavior:Addictive Behavior  In the past 3 months, have you felt or has someone told you that you have a problem with:  : None  Do you have a history of Chemical Use?: No  Do you have a history of Alcohol Use?: No  Do you have a history of Street Drug Abuse?: Yes  Histroy of Prescripton Drug Abuse?: No  Medical Problems:  Past Medical History:   Diagnosis Date    Bipolar disorder ([facility] [address]     states more manic    Depression     [facility]     Hypertension     Manic behavior ([facility]     PCOS (polycystic ovarian syndrome)        Risk:  Fall RiskTotal: 77  Ok Scale Ok Scale Score: 21  BVC Total: 0      Status EXAM:   Status and Exam  Normal: No  Facial Expression: Sad, Flat  Affect: Blunt  Level of Consciousness: Alert  Mood:Normal: No  Mood: Depressed, Anxious  Motor Activity:Normal: Yes  Interview Behavior: Cooperative  Preception: [place] to Person, [place] to Time, [place] to Place, [place] to Situation  Attention:Normal: Yes  Thought Processes: Circumstantial  Thought Content:Normal: Yes  Hallucinations: None(denies)  Delusions: No  Memory:Normal: Yes  Insight and Judgment: No  Insight and Judgment: Poor Judgment, Poor Insight  Present Suicidal Ideation: No  Present Homicidal Ideation: No    Daily Assessment Last Entry:   Daily Sleep (WDL): Within Defined Limits         Patient Currently in Pain: No  Daily Nutrition (WDL): Within Defined Limits    Patient Monitoring:  Frequency of Checks: 4 times per hour, close    Psychiatric Symptoms:   Depression Symptoms  Depression Symptoms: Feelings of hopelessess, Crying  Anxiety Symptoms  Anxiety Symptoms: Generalized  Dejah Symptoms  Dejah Symptoms: No problems reported or observed. Psychosis Symptoms  Delusion Type: No problems reported or observed. (not noted at this time)    Suicide Risk CSSR-S:  1) Within the past month, have you wished you were dead or wished you could go to sleep and not wake up? : Yes  2) Have you actually had any thoughts of killing yourself? : Yes  3) Have you been thinking about how you might kill yourself? : Yes  5) Have you started to work out or worked out the details of how to kill yourself? Do you intend to carry out this plan? : Yes  6) Have you ever done anything, started to do anything, or prepared to do anything to end your life?: Yes        EDUCATION:   Learner Progress Toward Treatment Goals: Reviewed results and recommendations of this team, Reviewed group plan and strategies, Reviewed signs, symptoms and risk of self harm and violent behavior and Reviewed goals and plan of care    Method: Individual    Outcome: Verbalized understanding and Demonstrated Understanding    PATIENT GOALS: Medication management. PLAN/TREATMENT RECOMMENDATIONS UPDATE:  1. How are you progressing toward meeting your main treatment goal? Verbalized improvement related to group experience. Pt interested in attending Al-Anon. Meeting list provided to pt. 2.  Are there discharge barriers/lingering problems that need to be addressed? Requested insurance provider list.      3.  Do you have the ability to pay for your medications? Yes      4. How is your group participation? Attending with good participation.     GOALS UPDATE:   Time frame for Short-Term Goals: Daily      EARL Marcial

## 2019-06-19 NOTE — GROUP NOTE
Group Therapy Note    Date: June 19    Group Start Time: 1330  Group End Time: 1430  Group Topic: Psychoeducation    [facility] Adult Psych 4E    EARL Velez          Notes:  Patient present in group. Patient minimally active in group discussion regarding good/bad things about today, goals she is presently working on and negative thoughts she is able to let go of. Patient is somewhat irritable in group. Patient did not share anything good about today. Patient reports that she is currently struggling with her family who have been arguing who should be taking care of her son while she is admitted into the hospital. Patient is tearful during this discussion. Patient reports that she is frustrated that people are fighting over who should be taking her son when she is trying to get better. Patient received peer support appropriately. Status After Intervention:  Unchanged    Participation Level:  Active Listener and Minimal    Participation Quality: Appropriate and Sharing      Speech:  normal      Thought Process/Content: Linear      Affective Functioning: Flat      Mood: anxious and irritable      Level of consciousness:  Alert, Oriented x4, Attentive and Preoccupied      Response to Learning: Able to verbalize current knowledge/experience and Progressing to goal      Endings: None Reported    Modes of Intervention: Education, Support, Socialization, Exploration, Clarifying and Problem-solving      Discipline Responsible: /Counselor      Signature:  EARL Velez

## 2019-06-19 NOTE — PROGRESS NOTES
Group Therapy Note    Date: 6/19/2019  Start Time: 1100  End Time: 3437  Number of Participants: 8      Notes:  Pt is present for group. Peer discussion discussed ways that they may communicate to their support systems, ways that individuals may be helpful in a manner that pt will respond to . Also discussed were areas of self worth and making decisions that are in their their own best interest in recovery. Peers discussed the benefit of daily goal setting, journaling and daily inventories. Identified with peers. Shared being clean and sober for 2 years. Processed her fears about her  coming home [facility] penitentiary and his own drug addiction. Identified Hernandez as a potential source of support for pt. Status After Intervention:  Improved    Participation Level:  Active Listener and Interactive    Participation Quality: Appropriate, Attentive, Sharing and Supportive      Speech:  normal      Thought Process/Content: Logical  Linear      Affective Functioning: Congruent       Mood: dysphoric but brightened      Level of consciousness:  Alert, Oriented x4 and Attentive      Response to Learning: Able to verbalize current knowledge/experience, Able to verbalize/acknowledge new learning, Able to retain information, Capable of insight, Able to change behavior and Progressing to goal      Endings: None Reported    Modes of Intervention: Education, Support, Socialization, Exploration, Clarifying and Problem-solving      Discipline Responsible: /Counselor      Signature:  EARL Fuentes

## 2019-06-19 NOTE — BH NOTE
INPATIENT RECREATIONAL THERAPY  ADULT BEHAVIORAL SERVICES  EVALUATION    REFERRING PHYSICIAN:   Dr. Esther Segovia  DIAGNOSIS:   Major Depressive Disorder without Psychotic features  PRECAUTIONS:   Suicide precautions    HISTORY OF PRESENT ILLNESS/INJURY:    Patient was admitted to the unit due to suicidal ideation and depression. Patient reported that she was having thoughts of taking an overdose prior to admission. Patient reported that she has been noncompliant with her medications. Patient was pleasant and cooperative at time of evaluation. PMH:  Please see medical chart for prior medical history, allergies, and medication    HISTORY OF PSYCHIATRIC TREATMENT:  IP: [facility]  OP: Joshua Antis OF BIRTH:   [birth year]  GENDER:   female  MARITAL STATUS:   Patient is single with one son. EMPLOYMENT STATUS:  Patient is employed. LIVING SITUATION/SUPPORT:  Patient lives with her son. EDUCATIONAL LEVEL:  HS graduate with some college  MEDICATION/DRUG USE:  History of substance abuse. Cocaine. History of alcohol abuse. LEISURE INTERESTS:  activities with her son, arts/crafts, painting,spiritual activities, watching TV/Movies, reading, listening to music, family activities, playing cards/games  ACTIVITY PREFERENCE:  No preference  ACTIVITY TYPES:   Passive. Active. Indoor. Outdoor. COGNITION:  A&Ox4    COPING:   poor  ATTENTION:  fair  RELAXATION:  Patient reported poor sleep. SELF-ESTEEM:  poor  MOTIVATION:   good    SOCIAL SKILLS:  good  FRUSTRATION TOLERANCE:   No history of violence. ATTENTION SEEKING:  None noted  COOPERATION:  Pleasant and cooperative  AFFECT:  Brightens with interaction  APPEARANCE:  appropriate    HEARING:  No problems noted   VISION:   Corrected with glasses   VERBAL COMMUNICATION:   No problems  WRITTEN COMMUNICATION:   No problems    COORDINATION:  No problems   MOBILITY:  Ambulates independently   GOALS:  Increase socialization by attending groups on the unit daily.

## 2019-06-19 NOTE — BH NOTE
Rodolfo Cormier R.N. has reviewed and agrees with Demetris Amaya LPN's shift   Assessment.     Jolly Rosales  6/19/2019

## 2019-06-19 NOTE — PROGRESS NOTES
Group Therapy Note     Date: 6/19/2019  Start Time: 1630                       End Time:  1700  Number of Participants:10     Type of Group: Healthy Living/Wellness     Wellness Binder Information  Module Name:  Music Therapy         Patient's Goal: Attended     Status After Intervention:  Improved     Participation Level: Interactive     Participation Quality: Attentive        Speech:  normal        Thought Process/Content: Logical        Affective Functioning: Congruent           Level of consciousness:  Alert        Response to Learning: Able to verbalize current knowledge/experience           Discipline Responsible: Licensed Practical Nurse        Signature:  Ann Lawrence LPN

## 2019-06-20 PROCEDURE — 6370000000 HC RX 637 (ALT 250 FOR IP): Performed by: PHYSICIAN ASSISTANT

## 2019-06-20 PROCEDURE — 6370000000 HC RX 637 (ALT 250 FOR IP): Performed by: PSYCHIATRY & NEUROLOGY

## 2019-06-20 PROCEDURE — 90833 PSYTX W PT W E/M 30 MIN: CPT | Performed by: PSYCHIATRY & NEUROLOGY

## 2019-06-20 PROCEDURE — 1240000000 HC EMOTIONAL WELLNESS R&B

## 2019-06-20 PROCEDURE — APPSS30 APP SPLIT SHARED TIME 16-30 MINUTES: Performed by: PHYSICIAN ASSISTANT

## 2019-06-20 PROCEDURE — 99232 SBSQ HOSP IP/OBS MODERATE 35: CPT | Performed by: PSYCHIATRY & NEUROLOGY

## 2019-06-20 RX ORDER — QUETIAPINE FUMARATE 25 MG/1
25 TABLET, FILM COATED ORAL
Status: DISCONTINUED | OUTPATIENT
Start: 2019-06-21 | End: 2019-06-21 | Stop reason: HOSPADM

## 2019-06-20 RX ADMIN — HYDROXYZINE HYDROCHLORIDE 50 MG: 25 TABLET, FILM COATED ORAL at 18:21

## 2019-06-20 RX ADMIN — TRAZODONE HYDROCHLORIDE 50 MG: 50 TABLET ORAL at 21:58

## 2019-06-20 RX ADMIN — QUETIAPINE FUMARATE 100 MG: 100 TABLET ORAL at 21:58

## 2019-06-20 RX ADMIN — PAROXETINE HYDROCHLORIDE 20 MG: 20 TABLET, FILM COATED ORAL at 09:06

## 2019-06-20 ASSESSMENT — PAIN SCALES - GENERAL
PAINLEVEL_OUTOF10: 0
PAINLEVEL_OUTOF10: 0

## 2019-06-20 NOTE — BH NOTE
PLAN OF CARE:     Start Time: 0900  End Time:  0930    Group Topic:  Daily Goals    Group Type:   Goal Group    Intervention/Goal:  To increase support and identify daily goals    Attendance:  Participated in group    Affect:   bright    Behavior:   Pleasant and cooperative    Response:  appropriate    Daily Goal:   To get a shower. To do a music group.      Progress:  Progressing to goal

## 2019-06-20 NOTE — PLAN OF CARE
Patient has attended all of the groups today and has also been out of her room for socialization with others so she has met her socialization goal.

## 2019-06-20 NOTE — GROUP NOTE
Group Therapy Note    Group Start Time: 9092  Group End Time: 1700  Group Topic: Healthy Living/Wellness    Notes:  Patient attended group with good participation. Discussed importance of values    Status After Intervention:  Improved    Participation Level:  Active Listener and Interactive    Participation Quality: Appropriate, Attentive, Sharing and Supportive    Speech:  normal    Thought Process/Content: Logical  Linear    Affective Functioning: Congruent    Mood: euthymic    Level of consciousness:  Alert, Oriented x4 and Attentive    Response to Learning: Able to verbalize current knowledge/experience, Able to verbalize/acknowledge new learning, Able to retain information, Capable of insight, Able to change behavior and Progressing to goal    Endings: None Reported    Modes of Intervention: Support, Socialization and Exploration    Discipline Responsible: Licensed Practical Nurse    Signature:  Elizabeth Vargas LPN

## 2019-06-20 NOTE — PLAN OF CARE
Problem: Depressive Behavior With or Without Suicide Precautions:  Goal: Able to verbalize and/or display a decrease in depressive symptoms  Description  Able to verbalize and/or display a decrease in depressive symptoms  6/20/2019 1159 by Yissel Phillips RN  Outcome: Ongoing  Note:   Patient reports mood \"tired\". Has blunt, worried, and flat affect. Speech clear. Good eye contact. Reports hope for future and identifies family as her support system. Problem: Depressive Behavior With or Without Suicide Precautions:  Goal: Ability to disclose and discuss suicidal ideas will improve  Description  Ability to disclose and discuss suicidal ideas will improve  6/20/2019 1159 by Yissel Phillips RN  Outcome: Ongoing  Note:   Patient denies suicidal ideations, no plan or intent to harm self. Patient remains on suicidal precautions 15 checks for safety. Instructed to seek staff as needed for thoughts of self harm. Problem: Depressive Behavior With or Without Suicide Precautions:  Goal: Able to verbalize support systems  Description  Able to verbalize support systems  6/20/2019 1159 by Yissel Phillips RN  Outcome: Ongoing  Note:   Patient reports family as his support system. Problem: Depressive Behavior With or Without Suicide Precautions:  Goal: Absence of self-harm  Description  Absence of self-harm  6/20/2019 1159 by Yissel Phillips RN  Outcome: Ongoing  Note:   No self harm behaviors were observed or reported so far this shift. Remains on every 15 minutes precautions for safety. Problem: Depressive Behavior With or Without Suicide Precautions:  Goal: Patient specific goal  Description  Patient specific goal  6/20/2019 1159 by Yissel Phillips RN  Outcome: Ongoing  Note:   [facility] This was met.        Problem: Depressive Behavior With or Without Suicide Precautions:  Goal: Participates in care planning  Description  Participates in care planning  6/20/2019 1159 by Satish Santos RN  Outcome: Ongoing  Note:   Patient discussed treatment plan with physician/medical staff, attending group, and compliant with medications. Problem: Discharge Planning:  Goal: Discharged to appropriate level of care  Description  Discharged to appropriate level of care  6/20/2019 1159 by Satish Santos RN  Outcome: Ongoing  Note:   Patient voices no needs before discharge. Patient plans to be discharged to home with son. Discharge planner working with patient to achieve optimal discharge plans, specific to individual needs. Problem: Activity:  Goal: Sleeping patterns will improve  Description  Sleeping patterns will improve  6/20/2019 1159 by Satish Santos RN  Outcome: Ongoing  Note:   Patient slept 7.5 hours last night, reports she slept \"ok, still tired\", reports seeing a shadow of kid behind door and reports that her roommate was also seeing it. Encourage patient not to take naps during the day, relax several hours before bed and to take prescribed sleep meds as ordered. Patient verbalized understanding. Problem: Anxiety:  Goal: Level of anxiety will decrease  Description  Level of anxiety will decrease  6/20/2019 1159 by Satish Santos RN  Outcome: Ongoing  Note:   Patient denies anxiety. Problem: KNOWLEDGE DEFICIT,EDUCATION,DISCHARGE PLAN  Goal: Knowledge - personal safety  6/20/2019 1159 by Satish Santos RN  Outcome: Ongoing  Note:   Maintained in safe and secure environment. Patient did not fill out safety plan this shift. Care plan reviewed with patient. Patient verbalize understanding of the plan of care and contribute to goal setting.

## 2019-06-20 NOTE — BH NOTE
PLAN OF CARE:     Start Time: 1000  End Time:  2247    Group Topic:  Dash, the therapy dog    Group Type:   Recreation  Group    Intervention/Goal:  To increase exercise and socialization. Attendance:  attended      Affect:  bright    Behavior:  Pleasant and cooperative    Response:  Appeared to enjoy playing with Arsh, the therapy dog.

## 2019-06-20 NOTE — PLAN OF CARE
Problem: Depressive Behavior With or Without Suicide Precautions:  Goal: Able to verbalize and/or display a decrease in depressive symptoms  Description  Able to verbalize and/or display a decrease in depressive symptoms  6/19/2019 2333 by Louis Harden RN  Outcome: Ongoing  Note:   Patient denies any feelings of depression this shift. 6/19/2019 1058 by Carolina Lewis LPN  Outcome: Ongoing  Note:   Patient able to verbalize and display a decrease in depressive symptoms  Goal: Ability to disclose and discuss suicidal ideas will improve  Description  Ability to disclose and discuss suicidal ideas will improve  6/19/2019 2333 by Louis Harden RN  Outcome: Ongoing  Note:   Patient denies any suicidal thoughts so far this shift. 6/19/2019 1058 by Carolina Lewis LPN  Outcome: Ongoing  Note:   Patient able to disclose and discuss suicidal ideas will improve   Goal: Able to verbalize support systems  Description  Able to verbalize support systems  6/19/2019 2333 by Louis Harden RN  Outcome: Ongoing  Note:   Patient states her family is her current support system. 6/19/2019 1058 by Carolina Lewis LPN  Outcome: Ongoing  Note:   Patient able to verbalize support system  Goal: Absence of self-harm  Description  Absence of self-harm  6/19/2019 2333 by Louis Harden RN  Outcome: Ongoing  Note:   No self harm noted so far this shift. 6/19/2019 1058 by Carolina Lewis LPN  Outcome: Ongoing  Note:   No self harm behaviors were observed or reported so far this shift. Remains on every 15 minutes precautions for safety. Goal: Patient specific goal  Description  Patient specific goal  6/19/2019 2333 by Louis Harden RN  Outcome: Ongoing  Note:   Patient stated her goal was to see her son.  Patient met this goal.  6/19/2019 1058 by Carolina Lewis LPN  Outcome: Ongoing  Note:   Patient stated specific goal   Goal: Participates in care planning  Description  Participates in care planning  6/19/2019 2333 by Randy Naranjo RN  Outcome: Ongoing  Note:   Care plan reviewed with patient and good understanding verbalized. 6/19/2019 1058 by Russ Orlando LPN  Outcome: Ongoing  Note:   Patient participates in care planning      Problem: Discharge Planning:  Goal: Discharged to appropriate level of care  Description  Discharged to appropriate level of care  6/19/2019 2333 by Randy Naranjo RN  Outcome: Ongoing  Note:   Patient states she plans to return home with her son at discharge. 6/19/2019 1058 by Russ Orlando LPN  Outcome: Ongoing  Note:   Discharge planner working with patient to achieve optimal discharge plan, specific to the needs of this patient. Problem: Activity:  Goal: Sleeping patterns will improve  Description  Sleeping patterns will improve  6/19/2019 2333 by Randy Naranjo RN  Outcome: Ongoing  Note:   Patient states she feels her sleep pattern has improved. 6/19/2019 1058 by Russ Orlando LPN  Outcome: Ongoing  Note:   Patient sleeping patterns will improve      Problem: Anxiety:  Goal: Level of anxiety will decrease  Description  Level of anxiety will decrease  6/19/2019 2333 by Randy Naranjo RN  Outcome: Ongoing  Note:   Patient states she continues to feel somewhat anxious this shift. 6/19/2019 1058 by Russ Orlando LPN  Outcome: Ongoing  Note:   Patient level of anxiety will improve with medication     Problem: KNOWLEDGE DEFICIT,EDUCATION,DISCHARGE PLAN  Goal: Knowledge - personal safety  6/19/2019 2333 by Randy Naranjo RN  Outcome: Ongoing  Note:   Patient verbalizes good understanding of safety precautions. 15 minute visual checks continued.   6/19/2019 1058 by Russ Orlando LPN  Outcome: Ongoing  Note:   Patient did not complete personal safety plan      Problem: Social interaction  Goal: Increased social interaction  6/19/2019 1539 by Rosemary Guo  Outcome: Met This Shift   Care plan reviewed with patient.   Patient does verbalize understanding of the plan of care and does contribute to goal setting
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
## 2019-06-20 NOTE — BH NOTE
Group Therapy Note    Date: 6/19/2019  Start Time: 2000  End Time:  2020  Number of Participants: 1    Type of Group: Wrap-Up/Relaxation    Wellness Binder Information  Module Name:  None  Session Number:  None    Patient's Goal:  To see my son    Notes:  Met    Status After Intervention:  Improved    Participation Level: Interactive    Participation Quality: Appropriate      Speech:  normal      Thought Process/Content: Logical      Affective Functioning: Congruent      Mood: anxious      Level of consciousness:  Oriented x4      Response to Learning: Capable of insight      Endings: None Reported    Modes of Intervention: Education      Discipline Responsible: Registered Nurse      Signature:   Terrie Phillips RN

## 2019-06-21 VITALS
SYSTOLIC BLOOD PRESSURE: 132 MMHG | RESPIRATION RATE: 16 BRPM | OXYGEN SATURATION: 100 % | BODY MASS INDEX: 40.97 KG/M2 | WEIGHT: 240 LBS | HEART RATE: 74 BPM | DIASTOLIC BLOOD PRESSURE: 60 MMHG | TEMPERATURE: 98.6 F | HEIGHT: 64 IN

## 2019-06-21 PROCEDURE — 6370000000 HC RX 637 (ALT 250 FOR IP): Performed by: PHYSICIAN ASSISTANT

## 2019-06-21 PROCEDURE — 5130000000 HC BRIDGE APPOINTMENT

## 2019-06-21 PROCEDURE — 6370000000 HC RX 637 (ALT 250 FOR IP): Performed by: PSYCHIATRY & NEUROLOGY

## 2019-06-21 PROCEDURE — 99239 HOSP IP/OBS DSCHRG MGMT >30: CPT | Performed by: PSYCHIATRY & NEUROLOGY

## 2019-06-21 RX ORDER — QUETIAPINE FUMARATE 100 MG/1
100 TABLET, FILM COATED ORAL NIGHTLY
Qty: 30 TABLET | Refills: 0 | Status: SHIPPED | OUTPATIENT
Start: 2019-06-21 | End: 2020-07-16

## 2019-06-21 RX ORDER — QUETIAPINE FUMARATE 25 MG/1
25 TABLET, FILM COATED ORAL
Qty: 30 TABLET | Refills: 0 | Status: SHIPPED | OUTPATIENT
Start: 2019-06-21 | End: 2020-07-16

## 2019-06-21 RX ORDER — TRAZODONE HYDROCHLORIDE 50 MG/1
50 TABLET ORAL NIGHTLY PRN
Qty: 30 TABLET | Refills: 0 | Status: SHIPPED | OUTPATIENT
Start: 2019-06-21 | End: 2020-07-16

## 2019-06-21 RX ORDER — HYDROXYZINE 50 MG/1
50 TABLET, FILM COATED ORAL 3 TIMES DAILY PRN
Qty: 45 TABLET | Refills: 0 | Status: SHIPPED | OUTPATIENT
Start: 2019-06-21 | End: 2019-07-06

## 2019-06-21 RX ORDER — PAROXETINE HYDROCHLORIDE 20 MG/1
20 TABLET, FILM COATED ORAL DAILY
Qty: 30 TABLET | Refills: 0 | Status: SHIPPED | OUTPATIENT
Start: 2019-06-22 | End: 2020-07-16

## 2019-06-21 RX ADMIN — ACETAMINOPHEN 650 MG: 325 TABLET ORAL at 08:17

## 2019-06-21 RX ADMIN — QUETIAPINE FUMARATE 25 MG: 25 TABLET ORAL at 09:57

## 2019-06-21 RX ADMIN — PAROXETINE HYDROCHLORIDE 20 MG: 20 TABLET, FILM COATED ORAL at 08:16

## 2019-06-21 ASSESSMENT — PAIN DESCRIPTION - FREQUENCY: FREQUENCY: CONTINUOUS

## 2019-06-21 ASSESSMENT — PAIN SCALES - GENERAL: PAINLEVEL_OUTOF10: 6

## 2019-06-21 ASSESSMENT — PAIN DESCRIPTION - PAIN TYPE: TYPE: ACUTE PAIN

## 2019-06-21 ASSESSMENT — PAIN - FUNCTIONAL ASSESSMENT: PAIN_FUNCTIONAL_ASSESSMENT: ACTIVITIES ARE NOT PREVENTED

## 2019-06-21 ASSESSMENT — PAIN DESCRIPTION - LOCATION: LOCATION: FOOT

## 2019-06-21 NOTE — DISCHARGE SUMMARY
Provider Discharge Summary     Patient ID:  Thuan Alejandro  [id number]  34 y.o.  1990    Admit date: 6/17/2019    Discharge date and time: 6/21/2019  9:51 AM     Admitting Physician: Iron Dyer MD     Discharge Physician: Iron Dyer MD    Admission Diagnoses: Major depressive disorder without psychotic features [F32.9]    Discharge Diagnoses:      Bipolar disorder with severe depression [facility])     Patient Active Problem List   Diagnosis Code    PCOS (polycystic ovarian syndrome) E28.2    Depression with anxiety F41.8    Bipolar disorder with severe depression ([address] F31.4    Episode of recurrent major depressive disorder ([facility] F33.9    Affective psychosis, bipolar ([facility] F31.9    Major depressive disorder without psychotic features F32.9        Admission Condition: poor    Discharged Condition: stable    Indication for Admission: threat to self    History of Present Illnes (present tense wording is of findings from admission exam and are not necessarily indicative of current findings):   Thuan Alejandro is a 34 y.o. female with a history of bipolar disorder, off meds x 2 years, who is admitted for suicidal ideations. She endorses worsening depression and an inability to get in to see an OP provider since her pregnacy last year. She endorses struggling with significant depression, frequent crying, sadness, poor motivation and isolation, not wanting to leave the house. She is poorly motivated and vegetative, and has gained quite a bit of weight. She becomes frustrated and angry easily and punches walls at times. She carries a diagnosis of bipolar disorder, however this was made at the time when she was using alcohol and cocaine. Over the last 2 years of sobriety, she is not able to describe any manic or hypomanic episodes.   Rather, her mood swings appear to be between normal and depressed, and are situational.  Associated life stressors include being a single parent and working full-time while baby's dad is incarcerated. They seem to have a tumultuous relationship. She denies euphoria, denies homicidal ideations, denies hallucinations. She enjoys being a mother and describes having developed a very strong bond with her son. She adamantly denies any current or past thoughts to harm her infant child. She is able to care for the child without becoming overly frustrated or physically harmful. She has appropriate respite when she needs a break. She has never felt like her child would be better off not being alive. She recalls having done well on Seroquel, Paxil, and Lamictal in the past, and hopes that these might be resumed. Hospital Course:   Upon admission, Sandip Cardoso was provided a safe secure environment, introduced to unit milieu. Patient participated in groups and individual therapies. Meds were adjusted as noted below. After few days of hospital care, patient began to feel improvement. Depression lifted, thoughts to harm self ceased. Sleep improved, appetite was good. On morning rounds 6/21/2019, Sandip Cardoso  endorses feeling ready for discharge. Patient denies suicidal or homicidal ideations, denies hallucinations or delusions. Denies SE's from meds. It was decided that maximum benefit from hospital care had been achieved and patient can be discharged. Consults:   None    Significant Diagnostic Studies: Routine labs and diagnostics    Treatments: Psychotropic medications, therapy with group, milieu, and 1:1 with nurses, social workers, PACAM/CNP, and Attending physician.       Discharge Medications:  Current Discharge Medication List      START taking these medications    Details   hydrOXYzine (ATARAX) 50 MG tablet Take 1 tablet by mouth 3 times daily as needed for Anxiety  Qty: 45 tablet, Refills: 0      PARoxetine (PAXIL) 20 MG tablet Take 1 tablet by mouth daily  Qty: 30 tablet, Refills: 0      traZODone (DESYREL) 50 MG tablet Take 1 tablet by mouth nightly as needed for Sleep  Qty: 30 tablet, Refills: 0      !! QUEtiapine (SEROQUEL) 100 MG tablet Take 1 tablet by mouth nightly  Qty: 30 tablet, Refills: 0      !! QUEtiapine (SEROQUEL) 25 MG tablet Take 1 tablet by mouth daily (before lunch)  Qty: 30 tablet, Refills: 0       !! - Potential duplicate medications found. Please discuss with provider. STOP taking these medications       gabapentin (NEURONTIN) 100 MG capsule Comments:   Reason for Stopping:         tiZANidine (ZANAFLEX) 2 MG tablet Comments:   Reason for Stopping:              Discharge Exam:  Level of consciousness:  Within normal limits  Appearance: Street clothes, seated, with good grooming  Behavior/Motor: No abnormalities noted  Attitude toward examiner:  Cooperative, attentive, good eye contact  Speech:  spontaneous, normal rate, normal volume and well articulated  Mood:  euthymic  Affect:  Full range  Thought processes:  linear, goal directed and coherent  Thought content:  denies homicidal ideation  Suicidal Ideation:  denies suicidal ideation  Delusions:  no evidence of delusions  Perceptual Disturbance:  denies any perceptual disturbance  Cognition:  Intact  Memory: age appropriate  Insight & Judgement: fair  Medication side effects: denies     Disposition: home    Patient Instructions: Activity: activity as tolerated  1. Patient instructed to take medications regularly and follow up with outpatient appointments. Follow-up as scheduled with Psychiatrist in [facility]:    Electronically signed by Benson Faust MD on 6/21/19 at 9:51 AM    Time Spent on discharge is more than 35 minutes in the examination, evaluation, counseling and review of medications and discharge plan.

## 2019-06-21 NOTE — PLAN OF CARE
naps during the day, relax several hours before bed and to take prescribed sleep meds as ordered. Patient verbalized understanding. Problem: Anxiety:  Goal: Level of anxiety will decrease  Description  Level of anxiety will decrease  6/20/2019 2253 by Sanjana Santana RN  Outcome: Ongoing  Note:   Pt was not feeling anxious this evening because she had atarax around 1830  6/20/2019 1159 by Conor Maxwell RN  Outcome: Ongoing  Note:   Patient denies anxiety. Problem: Depressive Behavior With or Without Suicide Precautions:  Goal: Able to verbalize support systems  Description  Able to verbalize support systems  6/20/2019 2253 by Sanjana Santana RN  Outcome: Completed  Note:   States her mom is a good support  6/20/2019 1159 by Conor Maxwell RN  Outcome: Ongoing  Note:   Patient reports family as his support system. Goal: Absence of self-harm  Description  Absence of self-harm  6/20/2019 2253 by Sanjana Santana RN  Outcome: Completed  Note:   No self harm, no urge to self harm  6/20/2019 1159 by Conor Maxwell RN  Outcome: Ongoing  Note:   No self harm behaviors were observed or reported so far this shift. Remains on every 15 minutes precautions for safety.

## 2019-06-21 NOTE — PROGRESS NOTES
Behavioral Health   Discharge Note    Pt discharged with followings belongings:   Dentures: None  Vision - Corrective Lenses: Glasses  Hearing Aid: None  Jewelry: None  Clothing: Shirt, Pants, Socks, Footwear  Were All Patient Medications Collected?: Not Applicable  Other Valuables: None   Valuables sent home with pt. Valuables retrieved from safe, Security envelope number:  [id number] and returned to patient. Patient left department with Departure Mode: By self via Mobility at Departure: Ambulatory, discharged to Discharged to: Private Residence. \"An Important Message from Marianathai Ysabel About Your Rights\" form reviewed, signed N/A. Patient education on aftercare instructions: YES  Bridge Appointment completed: Reviewed Discharge Instructions with patient. Patient verbalizes understanding and agreement with the discharge plan using the teachback method. Patient verbalize understanding of AVS:  YES.     Status EXAM upon discharge:  Status and Exam  Normal: No  Facial Expression: Worried, Brightened, Sad  Affect: Blunt  Level of Consciousness: Alert  Mood:Normal: No  Mood: Anxious, Depressed  Motor Activity:Normal: Yes  Interview Behavior: Cooperative  Preception: [place] to Person, [place] to Time, [place] to Place, [place] to Situation  Attention:Normal: Yes  Thought Processes: Circumstantial  Thought Content:Normal: Yes  Hallucinations: None  Delusions: No  Memory:Normal: Yes  Insight and Judgment: No  Insight and Judgment: Poor Insight  Present Suicidal Ideation: No  Present Homicidal Ideation: No    Tina Heller RN

## 2019-06-21 NOTE — PROGRESS NOTES
Discharge planning -Nell Chavez is scheduled for a diagnostic assessment with Lyssa Crawley on 07/10/19 at 9:30 am. at [facility] in [place]. [place] will begin the IOP group on 07/08/19. Please arrive at 0745 to register in the Outpatient testing office located in the [facility]

## 2019-06-21 NOTE — PLAN OF CARE
Problem: KNOWLEDGE DEFICIT,EDUCATION,DISCHARGE PLAN  Goal: Knowledge - personal safety  6/21/2019 1038 by Luther Iqbal, BEVERLY  Outcome: Completed  Note: 1. What are the warning signs when you begin thinking suicide or when you feel very distressed? I STOP SLEEPING AND HIDE IN MY HOME  2. What can you do by yourself to take your mind off of the problem? TAKE MY MEDICINE AND MAKE SURE I GO TO COUNSELING  3. If you are unable to deal with your distressed mood alone, contact trusted family members or friends. List several people in case your first choices are not available. AUNT [phone]  [phone]  4. Contact local professionals or emergency services if you continue to have suicidal thoughts or serious distress.   [phone] [facility] PHONE NUMBERS:        [phone]        [phone]        [phone] (for deaf or hearing impaired)    6/20/2019 2253 by Janie San RN  Outcome: Ongoing  Note:   Pt did not work on safety plan this evening

## 2019-06-24 ENCOUNTER — TELEPHONE (OUTPATIENT)
Dept: FAMILY MEDICINE CLINIC | Age: 29
End: 2019-06-24

## 2019-06-24 ENCOUNTER — TELEPHONE (OUTPATIENT)
Dept: PSYCHIATRY | Age: 29
End: 2019-06-24

## 2019-06-24 NOTE — TELEPHONE ENCOUNTER
Diana [facility] Transitions Initial Follow Up Call    Outreach made within 2 business days of discharge: Yes    Patient: Josafat Borja Patient : 1990   MRN: [id number]  Reason for Admission: There are no discharge diagnoses documented for the most recent discharge. Discharge Date: 19       Spoke with: RAUL to return call. Discharge department/facility: [facility]    Scheduled appointment with PCP within 7-14 days    Follow Up  No future appointments.     [address] [place] [address]

## 2019-06-24 NOTE — TELEPHONE ENCOUNTER
[facility] 119 for 4E post discharge call back. Mahad Sharpe did not have follow-up concerns.

## 2019-06-24 NOTE — TELEPHONE ENCOUNTER
[facility]iana [facility] Transitions Initial Follow Up Call    Outreach made within 2 business days of discharge: Yes    Patient: April Purdy Patient : 1990   MRN: [id number]  Reason for Admission: There are no discharge diagnoses documented for the most recent discharge. Discharge Date: 19       Spoke with: RAUL to return call. Discharge department/facility: [facility]    Scheduled appointment with PCP within 7-14 days    Follow Up  No future appointments.     [address] [place] [address]

## 2019-07-08 ENCOUNTER — HOSPITAL ENCOUNTER (OUTPATIENT)
Dept: PSYCHIATRY | Age: 29
Setting detail: THERAPIES SERIES
Discharge: HOME | End: 2019-07-08
Payer: COMMERCIAL

## 2019-07-08 PROCEDURE — 90853 GROUP PSYCHOTHERAPY: CPT

## 2019-07-08 NOTE — GROUP NOTE
Group Therapy Note    Date: July 8    Group Start Time: 10:00 AM  Group End Time: 11:00 AM  Group Topic: Cognitive Skills    [address]        Group Therapy Note    Attendees: 5         Patient's Goal:  To explore the topic of social support and recovery. Notes:  Pt is present for group and attentive. Identification of social support and the benefit of 12 step support group including emotions anonymous. Status After Intervention:  Unchanged    Participation Level:  Active Listener and Interactive    Participation Quality: Appropriate, Attentive and Sharing      Speech:  normal      Thought Process/Content: Logical  Linear      Affective Functioning: Congruent       Mood: dysphoric      Level of consciousness:  Alert, Oriented x4 and Attentive      Response to Learning: Able to verbalize current knowledge/experience, Able to verbalize/acknowledge new learning, Able to retain information, Capable of insight, Able to change behavior and Progressing to goal      Endings: None Reported    Modes of Intervention: Education, Support, Socialization, Exploration, Clarifying, Problem-solving and Activity      Discipline Responsible: /Counselor      Signature:  EARL Ronquillo

## 2019-07-15 ENCOUNTER — HOSPITAL ENCOUNTER (OUTPATIENT)
Dept: PSYCHIATRY | Age: 29
Setting detail: THERAPIES SERIES
Discharge: HOME | End: 2019-07-15
Payer: COMMERCIAL

## 2019-07-15 PROCEDURE — 90853 GROUP PSYCHOTHERAPY: CPT

## 2019-07-15 NOTE — GROUP NOTE
Group Therapy Note    Date: July 15    Group Start Time: 11:15 AM  Group End Time: 12:15 PM  Group Topic: [address]        Group Therapy Note    Attendees: 6                                                                               Group Therapy Note    Date: July 15    Group Start Time: 11:15 AM  Group End Time: 12:15 PM  Group Topic: Recovery    [address]        Group Therapy Note    Attendees: 6         Patient's Goal:  Explore the topic of acceptance. Notes: Pt is present for group. Peers processed feeling and situations relating to gratitude. Identification of acceptance as it relates to the stages of grief. Education provided on dual issues and recovery. Status After Intervention:  Improved    Participation Level:  Active Listener and Interactive    Participation Quality: Appropriate, Attentive, Sharing and Supportive      Speech:  normal      Thought Process/Content: Logical  Linear      Affective Functioning: Congruent      Mood: euphoric      Level of consciousness:  Alert, Oriented x4 and Attentive      Response to Learning: Able to verbalize current knowledge/experience, Able to verbalize/acknowledge new learning, Able to retain information, Capable of insight, Able to change behavior and Progressing to goal      Endings: None Reported    Modes of Intervention: Education, Support, Socialization, Exploration, Clarifying, Problem-solving, Activity and Movement      Discipline Responsible: /Counselor      Signature:  EARL Odonnell

## 2019-07-17 ENCOUNTER — HOSPITAL ENCOUNTER (OUTPATIENT)
Dept: PSYCHIATRY | Age: 29
Setting detail: THERAPIES SERIES
End: 2019-07-17
Payer: COMMERCIAL

## 2019-07-18 ENCOUNTER — HOSPITAL ENCOUNTER (OUTPATIENT)
Dept: PSYCHIATRY | Age: 29
Setting detail: THERAPIES SERIES
Discharge: HOME | End: 2019-07-18
Payer: COMMERCIAL

## 2019-07-18 PROCEDURE — 90853 GROUP PSYCHOTHERAPY: CPT

## 2019-07-22 ENCOUNTER — HOSPITAL ENCOUNTER (OUTPATIENT)
Dept: PSYCHIATRY | Age: 29
Setting detail: THERAPIES SERIES
Discharge: HOME | End: 2019-07-22
Payer: COMMERCIAL

## 2019-07-22 DIAGNOSIS — F10.10 ALCOHOL ABUSE: ICD-10-CM

## 2019-07-22 PROCEDURE — 99213 OFFICE O/P EST LOW 20 MIN: CPT | Performed by: PSYCHIATRY & NEUROLOGY

## 2019-07-22 PROCEDURE — APPSS30 APP SPLIT SHARED TIME 16-30 MINUTES: Performed by: PHYSICIAN ASSISTANT

## 2019-07-22 PROCEDURE — 90853 GROUP PSYCHOTHERAPY: CPT

## 2019-07-22 NOTE — GROUP NOTE
Group Therapy Note    Date: July 22    Group Start Time:  8:30 AM  Group End Time:  9:45 AM  Group Topic: Psychotherapy    [address]        Group Therapy Note    Attendees: 5         Patient's Goal: Decrease depression with peer support.      Notes:  Pt is present. Appropriate and supportive level of participation. Pt shared being 5 days sober from alcohol and drugs. Pt shared feeling embarrassed and ashamed after group last Thursday. Pt shared the nature of her conversation with her new  and her commitment to sobriety. She has resumed her medications.      Status After Intervention:  Improved     Participation Level:  Active Listener and Interactive     Participation Quality: Appropriate, Attentive, Sharing and Supportive        Speech:  normal        Thought Process/Content: Logical  Linear        Affective Functioning: Congruent, well kempt.        Mood: euthymic        Level of consciousness:  Alert, Oriented x4 and Attentive        Response to Learning: Able to verbalize current knowledge/experience, Able to verbalize/acknowledge new learning, Able to retain information, Capable of insight, Able to change behavior and Progressing to goal        Endings: None Reported     Modes of Intervention: Education, Support, Socialization, Exploration, Clarifying and Problem-solving        Discipline Responsible: /Counselor        Signature:  EARL Ronquillo
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
## 2019-07-24 ENCOUNTER — HOSPITAL ENCOUNTER (OUTPATIENT)
Dept: PSYCHIATRY | Age: 29
Setting detail: THERAPIES SERIES
End: 2019-07-24
Payer: COMMERCIAL

## 2019-07-26 ENCOUNTER — HOSPITAL ENCOUNTER (OUTPATIENT)
Dept: PSYCHIATRY | Age: 29
Setting detail: THERAPIES SERIES
Discharge: HOME | End: 2019-07-26
Payer: COMMERCIAL

## 2019-07-31 ENCOUNTER — HOSPITAL ENCOUNTER (OUTPATIENT)
Dept: PSYCHIATRY | Age: 29
Setting detail: THERAPIES SERIES
End: 2019-07-31
Payer: COMMERCIAL

## 2019-08-01 ENCOUNTER — HOSPITAL ENCOUNTER (OUTPATIENT)
Dept: PSYCHIATRY | Age: 29
Setting detail: THERAPIES SERIES
Discharge: HOME | End: 2019-08-01
Payer: COMMERCIAL

## 2019-08-02 ENCOUNTER — HOSPITAL ENCOUNTER (OUTPATIENT)
Dept: PSYCHIATRY | Age: 29
Setting detail: THERAPIES SERIES
Discharge: HOME | End: 2019-08-02
Payer: COMMERCIAL

## 2019-08-02 PROCEDURE — 90853 GROUP PSYCHOTHERAPY: CPT

## 2019-08-02 NOTE — GROUP NOTE
Group Therapy Note    Date: August 2    Group Start Time:  8:30 AM  Group End Time:  9:45 AM  Group Topic: Psychotherapy    [address]        Group Therapy Note    Attendees: 8         Patient's Goal:  Decrease depression with support. Notes:  Pt is present for group with appropriate interaction. Pt supportive of peers. Pt shared having relapsed on alcohol Saturday. Processed her history of unhealthy relationships, substance abuse and her desire to change. Pt explored her current marriage and her husbands own alcoholism. Per demonstrates a willingness to be honest.    Status After Intervention:  Improved    Participation Level:  Active Listener and Interactive    Participation Quality: Appropriate, Attentive, Sharing and Supportive      Speech:  normal      Thought Process/Content: Logical  Linear      Affective Functioning: Congruent      Mood: dysphoric      Level of consciousness:  Alert, Oriented x4 and Attentive      Response to Learning: Able to verbalize current knowledge/experience, Able to verbalize/acknowledge new learning, Able to retain information, Capable of insight, Able to change behavior and Progressing to goal      Endings: None Reported    Modes of Intervention: Education, Support, Socialization, Exploration, Clarifying and Problem-solving      Discipline Responsible: /Counselor      Signature:  EARL Costa

## 2019-08-07 ENCOUNTER — HOSPITAL ENCOUNTER (OUTPATIENT)
Dept: PSYCHIATRY | Age: 29
Setting detail: THERAPIES SERIES
End: 2019-08-07
Payer: COMMERCIAL

## 2019-08-08 ENCOUNTER — HOSPITAL ENCOUNTER (OUTPATIENT)
Dept: PSYCHIATRY | Age: 29
Setting detail: THERAPIES SERIES
Discharge: HOME | End: 2019-08-08
Payer: COMMERCIAL

## 2019-08-08 PROCEDURE — 90853 GROUP PSYCHOTHERAPY: CPT

## 2019-08-08 NOTE — GROUP NOTE
Group Therapy Note    Date: August 8    Group Start Time:  8:30 AM  Group End Time:  9:45 AM  Group Topic: Psychotherapy    [address]        Group Therapy Note    Attendees: 10         Patient's Goal:  Decrease depression with support. Notes:  Pt is present for group with active participation. Introduced self to 3 new peers today. Shared the events which led to treatment. Reports continued sobriety for she and her . Pt processed with peers how she continues to work towards her own acceptance over what she is unable to control. Status After Intervention:  Improved    Participation Level:  Active Listener and Interactive    Participation Quality: Appropriate, Attentive, Sharing and Supportive      Speech:  normal      Thought Process/Content: Logical  Linear      Affective Functioning: Congruent      Mood: euthymic      Level of consciousness:  Alert, Oriented x4 and Attentive      Response to Learning: Able to verbalize current knowledge/experience, Able to verbalize/acknowledge new learning, Able to retain information, Capable of insight, Able to change behavior and Progressing to goal      Endings: None Reported    Modes of Intervention: Education, Support, Socialization, Exploration, Clarifying, Problem-solving and Activity      Discipline Responsible: /Counselor      Signature:  EARL Ronquillo

## 2019-08-08 NOTE — GROUP NOTE
Group Therapy Note    Date: August 8    Group Start Time: 11:15 AM  Group End Time: 12:15 PM  Group Topic: Art Therapy     [address]        Group Therapy Note    Attendees: 8         Patient's Goal:  Explore the topic of recovery principals and activities. Notes:  Pt is present and actively participated in an activity facilitated by [facility]. Pt identified an inspirational saying and completed an art therapy exercise. Status After Intervention:  Improved    Participation Level:  Active Listener and Interactive    Participation Quality: Appropriate, Attentive, Sharing and Supportive      Speech:  normal      Thought Process/Content: Logical  Linear      Affective Functioning: Congruent      Mood: euthymic      Level of consciousness:  Alert, Oriented x4 and Attentive      Response to Learning: Able to verbalize current knowledge/experience, Able to verbalize/acknowledge new learning, Able to retain information, Capable of insight, Able to change behavior and Progressing to goal      Endings: None Reported    Modes of Intervention: Education, Support, Socialization, Exploration, Clarifying, Problem-solving and Activity      Discipline Responsible: /Counselor      Signature:  Jannelle Nyhan, LSW

## 2019-08-09 ENCOUNTER — HOSPITAL ENCOUNTER (OUTPATIENT)
Dept: PSYCHIATRY | Age: 29
Setting detail: THERAPIES SERIES
Discharge: HOME | End: 2019-08-09
Payer: COMMERCIAL

## 2019-08-09 PROCEDURE — 99213 OFFICE O/P EST LOW 20 MIN: CPT | Performed by: PHYSICIAN ASSISTANT

## 2019-08-09 PROCEDURE — 90853 GROUP PSYCHOTHERAPY: CPT

## 2019-08-09 NOTE — GROUP NOTE
Group Therapy Note    Date: August 9    Group Start Time: 10:00 AM  Group End Time: 11:00 AM  Group Topic: Cognitive Skills    [employer]    [address]        Group Therapy Note    Attendees:8         Patient's Goal:  Explore the topic of cognitive skills. Notes:  Pt is present with active participation. Pt continues to demonstrate the ability to challenge self defeating thoughts leading to self sabotaging behavior. Status After Intervention:  Improved    Participation Level:  Active Listener and Interactive    Participation Quality: Appropriate and Attentive      Speech:  normal      Thought Process/Content: Logical  Linear      Affective Functioning: Congruent      Mood: euthymic      Level of consciousness:  Alert, Oriented x4 and Attentive      Response to Learning: Able to verbalize current knowledge/experience, Able to verbalize/acknowledge new learning, Able to retain information, Capable of insight, Able to change behavior and Progressing to goal      Endings: None Reported    Modes of Intervention: Education, Support, Socialization, Exploration, Clarifying, Problem-solving and Activity      Discipline Responsible: /Counselor      Signature:  EARL [place]

## 2020-07-16 ENCOUNTER — HOSPITAL ENCOUNTER (EMERGENCY)
Age: 30
Discharge: HOME | End: 2020-07-16

## 2020-07-16 VITALS
DIASTOLIC BLOOD PRESSURE: 87 MMHG | TEMPERATURE: 98.3 F | WEIGHT: 255 LBS | SYSTOLIC BLOOD PRESSURE: 140 MMHG | RESPIRATION RATE: 18 BRPM | HEIGHT: 64 IN | OXYGEN SATURATION: 100 % | BODY MASS INDEX: 43.54 KG/M2 | HEART RATE: 69 BPM

## 2020-07-16 LAB
ANION GAP SERPL CALCULATED.3IONS-SCNC: 11 MEQ/L (ref 8–16)
BASOPHILS # BLD: 0.5 %
BASOPHILS ABSOLUTE: 0 THOU/MM3 (ref 0–0.1)
BUN BLDV-MCNC: 12 MG/DL (ref 7–22)
CALCIUM SERPL-MCNC: 9.1 MG/DL (ref 8.5–10.5)
CHLORIDE BLD-SCNC: 103 MEQ/L (ref 98–111)
CO2: 25 MEQ/L (ref 23–33)
CREAT SERPL-MCNC: 0.8 MG/DL (ref 0.4–1.2)
EOSINOPHIL # BLD: 1.9 %
EOSINOPHILS ABSOLUTE: 0.1 THOU/MM3 (ref 0–0.4)
ERYTHROCYTE [DISTWIDTH] IN BLOOD BY AUTOMATED COUNT: 12.9 % (ref 11.5–14.5)
ERYTHROCYTE [DISTWIDTH] IN BLOOD BY AUTOMATED COUNT: 44.6 FL (ref 35–45)
GFR SERPL CREATININE-BSD FRML MDRD: 85 ML/MIN/1.73M2
GLUCOSE BLD-MCNC: 131 MG/DL (ref 70–108)
HCT VFR BLD CALC: 42.1 % (ref 37–47)
HEMOGLOBIN: 13.3 GM/DL (ref 12–16)
IMMATURE GRANS (ABS): 0.02 THOU/MM3 (ref 0–0.07)
IMMATURE GRANULOCYTES: 0.3 %
LYMPHOCYTES # BLD: 24 %
LYMPHOCYTES ABSOLUTE: 1.5 THOU/MM3 (ref 1–4.8)
MCH RBC QN AUTO: 29.7 PG (ref 26–33)
MCHC RBC AUTO-ENTMCNC: 31.6 GM/DL (ref 32.2–35.5)
MCV RBC AUTO: 94 FL (ref 81–99)
MONOCYTES # BLD: 7.7 %
MONOCYTES ABSOLUTE: 0.5 THOU/MM3 (ref 0.4–1.3)
NUCLEATED RED BLOOD CELLS: 0 /100 WBC
OSMOLALITY CALCULATION: 279.1 MOSMOL/KG (ref 275–300)
PLATELET # BLD: 237 THOU/MM3 (ref 130–400)
PMV BLD AUTO: 9.6 FL (ref 9.4–12.4)
POTASSIUM SERPL-SCNC: 4.1 MEQ/L (ref 3.5–5.2)
PREGNANCY, SERUM: NEGATIVE
RBC # BLD: 4.48 MILL/MM3 (ref 4.2–5.4)
SEG NEUTROPHILS: 65.6 %
SEGMENTED NEUTROPHILS ABSOLUTE COUNT: 4.2 THOU/MM3 (ref 1.8–7.7)
SODIUM BLD-SCNC: 139 MEQ/L (ref 135–145)
WBC # BLD: 6.4 THOU/MM3 (ref 4.8–10.8)

## 2020-07-16 PROCEDURE — 80048 BASIC METABOLIC PNL TOTAL CA: CPT

## 2020-07-16 PROCEDURE — 99284 EMERGENCY DEPT VISIT MOD MDM: CPT

## 2020-07-16 PROCEDURE — 4500000002 HC ER NO CHARGE

## 2020-07-16 PROCEDURE — 84703 CHORIONIC GONADOTROPIN ASSAY: CPT

## 2020-07-16 PROCEDURE — 85025 COMPLETE CBC W/AUTO DIFF WBC: CPT

## 2020-07-16 PROCEDURE — 16020 DRESS/DEBRID P-THICK BURN S: CPT

## 2020-07-16 PROCEDURE — 36415 COLL VENOUS BLD VENIPUNCTURE: CPT

## 2020-07-16 PROCEDURE — 6370000000 HC RX 637 (ALT 250 FOR IP): Performed by: PHYSICIAN ASSISTANT

## 2020-07-16 RX ORDER — CEPHALEXIN 500 MG/1
500 CAPSULE ORAL 4 TIMES DAILY
Qty: 40 CAPSULE | Refills: 0 | Status: SHIPPED | OUTPATIENT
Start: 2020-07-16 | End: 2020-07-26

## 2020-07-16 RX ORDER — SULFAMETHOXAZOLE AND TRIMETHOPRIM 800; 160 MG/1; MG/1
1 TABLET ORAL 2 TIMES DAILY
Qty: 20 TABLET | Refills: 0 | Status: SHIPPED | OUTPATIENT
Start: 2020-07-16 | End: 2020-07-26

## 2020-07-16 RX ORDER — BACITRACIN ZINC AND POLYMYXIN B SULFATE 500; 1000 [USP'U]/G; [USP'U]/G
OINTMENT TOPICAL
Qty: 30 G | Refills: 0 | Status: SHIPPED | OUTPATIENT
Start: 2020-07-16 | End: 2020-07-23

## 2020-07-16 RX ORDER — ACETAMINOPHEN 325 MG/1
650 TABLET ORAL ONCE
Status: COMPLETED | OUTPATIENT
Start: 2020-07-16 | End: 2020-07-16

## 2020-07-16 RX ORDER — CLINDAMYCIN HYDROCHLORIDE 150 MG/1
450 CAPSULE ORAL ONCE
Status: COMPLETED | OUTPATIENT
Start: 2020-07-16 | End: 2020-07-16

## 2020-07-16 RX ADMIN — ACETAMINOPHEN 650 MG: 325 TABLET ORAL at 15:50

## 2020-07-16 RX ADMIN — CLINDAMYCIN HYDROCHLORIDE 450 MG: 150 CAPSULE ORAL at 15:50

## 2020-07-16 ASSESSMENT — PAIN SCALES - GENERAL
PAINLEVEL_OUTOF10: 4
PAINLEVEL_OUTOF10: 4
PAINLEVEL_OUTOF10: 0

## 2020-07-16 ASSESSMENT — PAIN DESCRIPTION - PROGRESSION
CLINICAL_PROGRESSION: RESOLVED
CLINICAL_PROGRESSION: GRADUALLY WORSENING

## 2020-07-16 ASSESSMENT — PAIN DESCRIPTION - FREQUENCY: FREQUENCY: INTERMITTENT

## 2020-07-16 ASSESSMENT — PAIN DESCRIPTION - PAIN TYPE
TYPE: ACUTE PAIN
TYPE: ACUTE PAIN

## 2020-07-16 ASSESSMENT — PAIN DESCRIPTION - LOCATION
LOCATION: LEG
LOCATION: LEG

## 2020-07-16 ASSESSMENT — PAIN DESCRIPTION - ORIENTATION
ORIENTATION: RIGHT;LOWER
ORIENTATION: RIGHT;LOWER

## 2020-07-16 ASSESSMENT — ENCOUNTER SYMPTOMS
SHORTNESS OF BREATH: 0
CHEST TIGHTNESS: 0

## 2020-07-16 ASSESSMENT — PAIN DESCRIPTION - ONSET: ONSET: ON-GOING

## 2020-07-16 ASSESSMENT — PAIN DESCRIPTION - DESCRIPTORS: DESCRIPTORS: STABBING

## 2020-07-16 NOTE — ED NOTES
Cleansed wound with antiseptic wound wash and rinsed with normal saline. Patted dry with sterile gauze. Nonstick bandage placed and wrapped with gauze dressing. Tolerated well. Plan for discharge on ATB.       Fannie Major RN  07/16/20 1969
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
## 2020-07-20 ENCOUNTER — TELEPHONE (OUTPATIENT)
Dept: FAMILY MEDICINE CLINIC | Age: 30
End: 2020-07-20

## 2020-07-20 NOTE — LETTER
July 20, 2020    [facility]    Dear [place],    This letter is regarding your Emergency Department (ED) visit at [facility] on 7/16/20. Bob Rangel wanted to make sure that you understand your discharge instructions and that you were able to fill any prescriptions that may have been ordered for you. Please contact the office at the above phone number if the ED advised you to follow up with Bob Rangel, or if you have any further questions or needs. Also did you know -   *Visiting the ED for a non-emergency could result in higher co-pays than you would normally be subject to paying? [facility]) practices can often offer you an appointment on the same day that you call for acute issues. *We have some [facility] offices that offer Walk-in appointments; check our website for availability in your community, [email or web address]. [email or web address].      *Evisits are now available for patients for through [address] If you do not have MyChart and are interested, please contact the office and a staff member may assist you or go to [email or web address].     Sincerely,   JAMESON Flores CNP and your [facility]
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
## 2022-01-12 ENCOUNTER — VIRTUAL VISIT (OUTPATIENT)
Dept: FAMILY MEDICINE CLINIC | Age: 32
End: 2022-01-12
Payer: MEDICAID

## 2022-01-12 ENCOUNTER — TELEPHONE (OUTPATIENT)
Dept: FAMILY MEDICINE CLINIC | Age: 32
End: 2022-01-12

## 2022-01-12 ENCOUNTER — HOSPITAL ENCOUNTER (OUTPATIENT)
Age: 32
Discharge: HOME | End: 2022-01-12
Payer: MEDICAID

## 2022-01-12 DIAGNOSIS — F31.4 BIPOLAR DISORDER, CURRENT EPISODE DEPRESSED, SEVERE, WITHOUT PSYCHOTIC FEATURES (HCC): ICD-10-CM

## 2022-01-12 DIAGNOSIS — Z20.822 EXPOSURE TO COVID-19 VIRUS: ICD-10-CM

## 2022-01-12 DIAGNOSIS — J02.9 SORE THROAT: ICD-10-CM

## 2022-01-12 DIAGNOSIS — R50.9 FEBRILE ILLNESS: Primary | ICD-10-CM

## 2022-01-12 LAB
INFLUENZA A: NOT DETECTED
INFLUENZA B: NOT DETECTED
SARS-COV-2 RNA, RT PCR: DETECTED

## 2022-01-12 PROCEDURE — 87636 SARSCOV2 & INF A&B AMP PRB: CPT

## 2022-01-12 PROCEDURE — 99213 OFFICE O/P EST LOW 20 MIN: CPT | Performed by: NURSE PRACTITIONER

## 2022-01-12 ASSESSMENT — ENCOUNTER SYMPTOMS
RHINORRHEA: 1
COUGH: 1
ABDOMINAL PAIN: 0
SORE THROAT: 1
SHORTNESS OF BREATH: 0
NAUSEA: 0

## 2022-01-12 NOTE — TELEPHONE ENCOUNTER
Patient calling with c/o fever 101.8 today, sore throat, HA, cough and chest congestion. S/S began yesterday. Requesting COVID testing.   Please advise

## 2022-01-12 NOTE — PROGRESS NOTES
Subjective:      Patient ID: Aman Roman is a 36 y.o. female    HPI: Acute for URI    TELEHEALTH EVALUATION -- Audio/Visual (During JGZIS-67 public health emergency)    Patient identification was verified at the start of the visit: Yes    Services were provided through a video synchronous discussion virtually to substitute for in-person clinic visit. Patient and provider were located at their individual homes. Patient has requested an audio/video evaluation for the following concern(s):    Chief Complaint   Patient presents with    Concern For JSQND-78       Patient calling with c/o fever 101.8 today, sore throat, HA, cough and chest congestion. S/S began yesterday. Denies CP, SOB or chest tightness     was exposed to COVID last week. He started in with mild symptoms over weekend and worsening of symptoms yesterday - fever, chills. Patient Active Problem List   Diagnosis    PCOS (polycystic ovarian syndrome)    Depression with anxiety    B[facility] disorder ([facility]    Episode of recurrent major depressive disorder ([facility]    Affective psychosis, bipolar ([facility]    Major depressive disorder without psychotic features    Alcohol abuse       Review of Systems   Constitutional: Positive for activity change, chills, fatigue and fever. HENT: Positive for congestion, postnasal drip, rhinorrhea and sore throat. Respiratory: Positive for cough. Negative for shortness of breath. Cardiovascular: Negative for chest pain. Gastrointestinal: Negative for abdominal pain and nausea. Skin: Negative for rash. Neurological: Positive for weakness and headaches. Negative for dizziness and light-headedness. Psychiatric/Behavioral: Negative. Objective:   Physical Exam  Constitutional:       General: She is not in acute distress. Appearance: She is not ill-appearing. Pulmonary:      Effort: Pulmonary effort is normal. No respiratory distress.    Neurological:      Mental Status: She is alert and oriented to person, place, and time. Psychiatric:         Mood and Affect: Mood normal.         Behavior: Behavior normal.         Assessment:       Diagnosis Orders   1. Febrile illness  COVID-19   2. Sore throat  COVID-19   3. Exposure to COVID-19 virus  COVID-19   4. Bipolar disorder, current episode depressed, severe, without psychotic features ([facility]         Plan:     COVID-19/FLU testing  Viral nature of symptoms discussed  Symptomatic Care  Increase fluids and rest  RTO if symptoms worsen or stay the same       Due to this being a TeleHealth encounter ([facility] emergency), evaluation of the following organ systems was limited: Vitals/Constitutional/EENT/Resp/CV/GI//MS/Neuro/Skin/Heme-Lymph-Imm. Pursuant to the emergency declaration under the [address] [place] [address], [address] [place] [address] authority and the [employer] and [employer] Act, this Virtual Visit was conducted with patient's (and/or legal guardian's) consent, to reduce the patient's risk of exposure to COVID-19 and provide necessary medical care. The patient (and/or legal guardian) has also been advised to contact this office for worsening conditions or problems, and seek emergency medical treatment and/or call 911 if deemed necessary. --JAMESON Chowdhury CNP on 1/12/2022 at 12:53 PM    An electronic signature was used to authenticate this note.      1/12/2022

## 2022-01-31 ENCOUNTER — OFFICE VISIT (OUTPATIENT)
Dept: FAMILY MEDICINE CLINIC | Age: 32
End: 2022-01-31
Payer: MEDICAID

## 2022-01-31 VITALS
HEART RATE: 72 BPM | WEIGHT: 293 LBS | RESPIRATION RATE: 18 BRPM | BODY MASS INDEX: 50.02 KG/M2 | DIASTOLIC BLOOD PRESSURE: 80 MMHG | HEIGHT: 64 IN | SYSTOLIC BLOOD PRESSURE: 116 MMHG

## 2022-01-31 DIAGNOSIS — E66.01 MORBID OBESITY WITH BMI OF 50.0-59.9, ADULT (HCC): Primary | ICD-10-CM

## 2022-01-31 DIAGNOSIS — B07.8 FLAT WART: ICD-10-CM

## 2022-01-31 PROBLEM — E66.9 OBESITY: Status: ACTIVE | Noted: 2022-01-31

## 2022-01-31 PROCEDURE — G8427 DOCREV CUR MEDS BY ELIG CLIN: HCPCS | Performed by: NURSE PRACTITIONER

## 2022-01-31 PROCEDURE — G8417 CALC BMI ABV UP PARAM F/U: HCPCS | Performed by: NURSE PRACTITIONER

## 2022-01-31 PROCEDURE — 1036F TOBACCO NON-USER: CPT | Performed by: NURSE PRACTITIONER

## 2022-01-31 PROCEDURE — 99213 OFFICE O/P EST LOW 20 MIN: CPT | Performed by: NURSE PRACTITIONER

## 2022-01-31 PROCEDURE — G8482 FLU IMMUNIZE ORDER/ADMIN: HCPCS | Performed by: NURSE PRACTITIONER

## 2022-01-31 RX ORDER — IMIQUIMOD 12.5 MG/.25G
CREAM TOPICAL
Qty: 24 EACH | Refills: 1 | Status: SHIPPED | OUTPATIENT
Start: 2022-01-31 | End: 2022-02-02 | Stop reason: SDUPTHER

## 2022-01-31 RX ORDER — NORELGESTROMIN AND ETHINYL ESTRADIOL 150; 35 UG/D; UG/D
1 PATCH TRANSDERMAL WEEKLY
COMMUNITY
Start: 2022-01-26

## 2022-01-31 SDOH — ECONOMIC STABILITY: FOOD INSECURITY: WITHIN THE PAST 12 MONTHS, THE FOOD YOU BOUGHT JUST DIDN'T LAST AND YOU DIDN'T HAVE MONEY TO GET MORE.: NEVER TRUE

## 2022-01-31 SDOH — ECONOMIC STABILITY: FOOD INSECURITY: WITHIN THE PAST 12 MONTHS, YOU WORRIED THAT YOUR FOOD WOULD RUN OUT BEFORE YOU GOT MONEY TO BUY MORE.: NEVER TRUE

## 2022-01-31 ASSESSMENT — PATIENT HEALTH QUESTIONNAIRE - PHQ9
6. FEELING BAD ABOUT YOURSELF - OR THAT YOU ARE A FAILURE OR HAVE LET YOURSELF OR YOUR FAMILY DOWN: 0
SUM OF ALL RESPONSES TO PHQ9 QUESTIONS 1 & 2: 0
SUM OF ALL RESPONSES TO PHQ QUESTIONS 1-9: 0
3. TROUBLE FALLING OR STAYING ASLEEP: 0
9. THOUGHTS THAT YOU WOULD BE BETTER OFF DEAD, OR OF HURTING YOURSELF: 0
7. TROUBLE CONCENTRATING ON THINGS, SUCH AS READING THE NEWSPAPER OR WATCHING TELEVISION: 0
SUM OF ALL RESPONSES TO PHQ QUESTIONS 1-9: 0
SUM OF ALL RESPONSES TO PHQ QUESTIONS 1-9: 0
10. IF YOU CHECKED OFF ANY PROBLEMS, HOW DIFFICULT HAVE THESE PROBLEMS MADE IT FOR YOU TO DO YOUR WORK, TAKE CARE OF THINGS AT HOME, OR GET ALONG WITH OTHER PEOPLE: 0
2. FEELING DOWN, DEPRESSED OR HOPELESS: 0
5. POOR APPETITE OR OVEREATING: 0
8. MOVING OR SPEAKING SO SLOWLY THAT OTHER PEOPLE COULD HAVE NOTICED. OR THE OPPOSITE, BEING SO FIGETY OR RESTLESS THAT YOU HAVE BEEN MOVING AROUND A LOT MORE THAN USUAL: 0
4. FEELING TIRED OR HAVING LITTLE ENERGY: 0
SUM OF ALL RESPONSES TO PHQ QUESTIONS 1-9: 0
1. LITTLE INTEREST OR PLEASURE IN DOING THINGS: 0

## 2022-01-31 ASSESSMENT — ENCOUNTER SYMPTOMS
SHORTNESS OF BREATH: 0
ABDOMINAL PAIN: 0
NAUSEA: 0
COUGH: 0

## 2022-01-31 ASSESSMENT — SOCIAL DETERMINANTS OF HEALTH (SDOH): HOW HARD IS IT FOR YOU TO PAY FOR THE VERY BASICS LIKE FOOD, HOUSING, MEDICAL CARE, AND HEATING?: NOT HARD AT ALL

## 2022-01-31 NOTE — PROGRESS NOTES
Creed Ahumada (1990) 36 y.o. female here for evaluation of the following chief complaint(s):      HPI:  Chief Complaint   Patient presents with    Other     spot on left lower leg and right jaw    Obesity       Would like to see Weight Management. 2 month PP. Family hx of obesity related medical conditions. No personal hx. Body mass index is 51.63 kg/m². Wt Readings from Last 3 Encounters:   01/31/22 (!) 300 lb 12.8 oz (136.4 kg)   07/16/20 255 lb (115.7 kg)   10/15/18 238 lb (108 kg)       Spot on right jaw. Increase in size. Has had since teenager. Was dx with wart at at that time. Was frozen but did not clear up. Growing linear. Patient Active Problem List   Diagnosis    PCOS (polycystic ovarian syndrome)    Depression with anxiety    B[facility] disorder ([facility]    Episode of recurrent major depressive disorder ([facility]    Affective psychosis, bipolar ([facility]    Major depressive disorder without psychotic features    Alcohol abuse    Obesity       SUBJECTIVE/OBJECTIVE:  Review of Systems   Constitutional: Negative for chills and fever. HENT: Negative. Respiratory: Negative for cough and shortness of breath. Cardiovascular: Negative for chest pain. Gastrointestinal: Negative for abdominal pain and nausea. Skin: Negative for rash. Neurological: Negative for dizziness, light-headedness and headaches. Psychiatric/Behavioral: Negative. Physical Exam  Constitutional:       General: She is not in acute distress. HENT:      Head:     Eyes:      Pupils: Pupils are equal, round, and reactive to light. Cardiovascular:      Rate and Rhythm: Normal rate and regular rhythm. Heart sounds: No murmur heard. Pulmonary:      Effort: Pulmonary effort is normal.      Breath sounds: Normal breath sounds. No wheezing. Abdominal:      General: Bowel sounds are normal. There is no distension. Palpations: Abdomen is soft. Tenderness:  There is no abdominal tenderness. Musculoskeletal:         General: No tenderness. Normal range of motion. Cervical back: Normal range of motion and neck supple. Skin:     General: Skin is warm and dry. Findings: No rash. ASSESSMENT/PLAN:   Diagnosis Orders   1. Morbid obesity with BMI of 50.0-59.9, adult [facility])  [facility]   2. Flat wart  imiquimod (ALDARA) 5 % cream         MDM: Refer to Weight Management   Skin lesions flat warts   Aldara Cream 3x week   RTO PRN      An electronic signature was used to authenticate this note.     --Cesar Patrick, APRN - CNP

## 2022-02-01 ENCOUNTER — TELEPHONE (OUTPATIENT)
Dept: FAMILY MEDICINE CLINIC | Age: 32
End: 2022-02-01

## 2022-02-01 DIAGNOSIS — L57.0 ACTINIC KERATOSIS: Primary | ICD-10-CM

## 2022-02-01 DIAGNOSIS — B07.8 FLAT WART: ICD-10-CM

## 2022-02-01 NOTE — TELEPHONE ENCOUNTER
PA request received from pharmacy for Imiquimod 5% Cream (Aldara) #12 for 30 days. PA submitted online at [email or web address] and pending review.

## 2022-02-01 NOTE — TELEPHONE ENCOUNTER
Office notes requested per [facility] for review.   OV from 1/31/22 printed and faxed to [phone]

## 2022-02-02 RX ORDER — IMIQUIMOD 12.5 MG/.25G
CREAM TOPICAL
Qty: 24 EACH | Refills: 1 | Status: SHIPPED | OUTPATIENT
Start: 2022-02-02 | End: 2022-02-09

## 2022-02-02 NOTE — TELEPHONE ENCOUNTER
Your PA request has been denied. Additional information will be provided in the denial communication.     Denied due to must have a DX of: Actinic Keratosis, superficial basal cell carcinoma or genital or perianal warts    Please advise

## 2022-03-08 ENCOUNTER — TELEMEDICINE (OUTPATIENT)
Dept: FAMILY MEDICINE CLINIC | Age: 32
End: 2022-03-08
Payer: MEDICAID

## 2022-03-08 DIAGNOSIS — F31.4 BIPOLAR DISORDER, CURRENT EPISODE DEPRESSED, SEVERE, WITHOUT PSYCHOTIC FEATURES (HCC): Primary | ICD-10-CM

## 2022-03-08 PROCEDURE — G8427 DOCREV CUR MEDS BY ELIG CLIN: HCPCS | Performed by: NURSE PRACTITIONER

## 2022-03-08 PROCEDURE — 99213 OFFICE O/P EST LOW 20 MIN: CPT | Performed by: NURSE PRACTITIONER

## 2022-03-08 RX ORDER — PAROXETINE 10 MG/1
10 TABLET, FILM COATED ORAL DAILY
Qty: 30 TABLET | Refills: 0 | Status: SHIPPED | OUTPATIENT
Start: 2022-03-08 | End: 2022-04-04 | Stop reason: SDUPTHER

## 2022-03-08 RX ORDER — QUETIAPINE FUMARATE 50 MG/1
50 TABLET, EXTENDED RELEASE ORAL NIGHTLY
Qty: 30 TABLET | Refills: 0 | Status: SHIPPED | OUTPATIENT
Start: 2022-03-08 | End: 2022-04-04

## 2022-03-08 ASSESSMENT — ENCOUNTER SYMPTOMS
COUGH: 0
ABDOMINAL PAIN: 0
SHORTNESS OF BREATH: 0
NAUSEA: 0

## 2022-03-08 NOTE — PROGRESS NOTES
Subjective:      Patient ID: Love De La Paz is a 36 y.o. female    HPI: acute for Bipolar    TELEHEALTH EVALUATION -- Audio/Visual (During QEYUT-93 public health emergency)    Patient identification was verified at the start of the visit: Yes    Services were provided through a video synchronous discussion virtually to substitute for in-person clinic visit. Patient and provider were located at their individual homes. Patient has requested an audio/video evaluation for the following concern(s):    Chief Complaint   Patient presents with    Manic Behavior       Started new job. 3 months PP - no breastfeeding. Medical issues with . High stress. Hx of Bipolar. Has a lot of changes happening can cause this flare to . Benefit in past with combo of Seroquel and Paxil to calm her bipolar down. Level out her mood. Patient Active Problem List   Diagnosis    PCOS (polycystic ovarian syndrome)    Depression with anxiety    Bipolar disorder ([address]    Episode of recurrent major depressive disorder ([address]    Affective psychosis, bipolar ([address]    Major depressive disorder without psychotic features    Alcohol abuse    Obesity       Review of Systems   Constitutional: Negative for chills and fever. HENT: Negative. Respiratory: Negative for cough and shortness of breath. Cardiovascular: Negative for chest pain. Gastrointestinal: Negative for abdominal pain and nausea. Skin: Negative for rash. Neurological: Negative for dizziness, light-headedness and headaches. Psychiatric/Behavioral: Positive for agitation, behavioral problems and dysphoric mood. The patient is nervous/anxious. Objective:   Physical Exam  Constitutional:       General: She is not in acute distress. Appearance: She is not ill-appearing. Pulmonary:      Effort: Pulmonary effort is normal. No respiratory distress. Neurological:      Mental Status: She is alert and oriented to person, place, and time. Psychiatric:         Mood and Affect: Mood is anxious. Speech: Speech normal.         Behavior: Behavior is hyperactive. Thought Content: Thought content normal.         Judgment: Judgment is impulsive. Assessment:       Diagnosis Orders   1. Bipolar disorder, current episode depressed, severe, without psychotic features ([facility]  QUEtiapine (SEROQUEL XR) 50 MG extended release tablet    PARoxetine (PAXIL) 10 MG tablet       Plan:     Paxil 10 mg + Seroquel 50 mg XR  NOn-pharm tx discussed  Call with update in 2 weeks         [facility], was evaluated through a synchronous (real-time) audio-video encounter. The patient (or guardian if applicable) is aware that this is a billable service, which includes applicable co-pays. This Virtual Visit was conducted with patient's (and/or legal guardian's) consent. The visit was conducted pursuant to the emergency declaration under the [address] [place] [address], [address] [place] [address] authority and the [employer] and [employer] Act. Patient identification was verified, and a caregiver was present when appropriate. The patient was located in a state where the provider was licensed to provide care. --JAMESON Lawrence CNP on 3/8/2022 at 3:09 PM    An electronic signature was used to authenticate this note.      3/8/2022

## 2022-04-03 DIAGNOSIS — F31.4 BIPOLAR DISORDER, CURRENT EPISODE DEPRESSED, SEVERE, WITHOUT PSYCHOTIC FEATURES (HCC): ICD-10-CM

## 2022-04-04 RX ORDER — PAROXETINE 10 MG/1
TABLET, FILM COATED ORAL
Qty: 30 TABLET | Refills: 0 | OUTPATIENT
Start: 2022-04-04

## 2022-04-04 RX ORDER — PAROXETINE 10 MG/1
10 TABLET, FILM COATED ORAL DAILY
Qty: 90 TABLET | Refills: 1 | Status: SHIPPED | OUTPATIENT
Start: 2022-04-04

## 2022-04-04 NOTE — TELEPHONE ENCOUNTER
Spoke with pt who says the Paxil is doing fine and she would like to continue. If no call back she will check with the pharmacy after 1pm. Refill if appropriate. Regarding the Seroquel, pt took this for 3days. \"It knocked me out and I felt like a zombie\". Pt says this is a \"no go\", she needs to be able to function for her 2 children and at work. RORO.

## 2022-04-05 DIAGNOSIS — F31.4 BIPOLAR DISORDER, CURRENT EPISODE DEPRESSED, SEVERE, WITHOUT PSYCHOTIC FEATURES (HCC): ICD-10-CM

## 2022-04-05 RX ORDER — QUETIAPINE FUMARATE 50 MG/1
TABLET, EXTENDED RELEASE ORAL
Qty: 30 TABLET | Refills: 0 | OUTPATIENT
Start: 2022-04-05

## 2022-12-07 ENCOUNTER — OFFICE VISIT (OUTPATIENT)
Dept: FAMILY MEDICINE CLINIC | Age: 32
End: 2022-12-07
Payer: MEDICAID

## 2022-12-07 ENCOUNTER — HOSPITAL ENCOUNTER (OUTPATIENT)
Dept: GENERAL RADIOLOGY | Age: 32
Discharge: HOME | End: 2022-12-07
Payer: MEDICAID

## 2022-12-07 ENCOUNTER — HOSPITAL ENCOUNTER (OUTPATIENT)
Age: 32
Discharge: HOME | End: 2022-12-07
Payer: MEDICAID

## 2022-12-07 VITALS
WEIGHT: 293 LBS | HEART RATE: 80 BPM | RESPIRATION RATE: 16 BRPM | SYSTOLIC BLOOD PRESSURE: 130 MMHG | BODY MASS INDEX: 50.74 KG/M2 | DIASTOLIC BLOOD PRESSURE: 74 MMHG

## 2022-12-07 DIAGNOSIS — R10.9 FLANK PAIN: ICD-10-CM

## 2022-12-07 DIAGNOSIS — R30.0 DYSURIA: ICD-10-CM

## 2022-12-07 DIAGNOSIS — R53.83 OTHER FATIGUE: ICD-10-CM

## 2022-12-07 DIAGNOSIS — R30.0 DYSURIA: Primary | ICD-10-CM

## 2022-12-07 LAB
ANION GAP SERPL CALCULATED.3IONS-SCNC: 12 MEQ/L (ref 8–16)
BILIRUBIN, POC: NEGATIVE
BLOOD URINE, POC: ABNORMAL
BUN BLDV-MCNC: 14 MG/DL (ref 7–22)
CALCIUM SERPL-MCNC: 9.5 MG/DL (ref 8.5–10.5)
CHLORIDE BLD-SCNC: 103 MEQ/L (ref 98–111)
CLARITY, POC: ABNORMAL
CO2: 24 MEQ/L (ref 23–33)
COLOR, POC: ABNORMAL
CREAT SERPL-MCNC: 0.8 MG/DL (ref 0.4–1.2)
GFR SERPL CREATININE-BSD FRML MDRD: > 60 ML/MIN/1.73M2
GLUCOSE BLD-MCNC: 92 MG/DL (ref 70–108)
GLUCOSE URINE, POC: NEGATIVE
KETONES, POC: ABNORMAL
LEUKOCYTE EST, POC: NEGATIVE
NITRITE, POC: NEGATIVE
PH, POC: 5.5
POTASSIUM SERPL-SCNC: 4.6 MEQ/L (ref 3.5–5.2)
PROTEIN, POC: NEGATIVE
SODIUM BLD-SCNC: 139 MEQ/L (ref 135–145)
SPECIFIC GRAVITY, POC: >=1.03
UROBILINOGEN, POC: ABNORMAL

## 2022-12-07 PROCEDURE — 81003 URINALYSIS AUTO W/O SCOPE: CPT | Performed by: NURSE PRACTITIONER

## 2022-12-07 PROCEDURE — G8484 FLU IMMUNIZE NO ADMIN: HCPCS | Performed by: NURSE PRACTITIONER

## 2022-12-07 PROCEDURE — G8417 CALC BMI ABV UP PARAM F/U: HCPCS | Performed by: NURSE PRACTITIONER

## 2022-12-07 PROCEDURE — 74018 RADEX ABDOMEN 1 VIEW: CPT

## 2022-12-07 PROCEDURE — 80048 BASIC METABOLIC PNL TOTAL CA: CPT

## 2022-12-07 PROCEDURE — 36415 COLL VENOUS BLD VENIPUNCTURE: CPT

## 2022-12-07 PROCEDURE — 99213 OFFICE O/P EST LOW 20 MIN: CPT | Performed by: NURSE PRACTITIONER

## 2022-12-07 PROCEDURE — 1036F TOBACCO NON-USER: CPT | Performed by: NURSE PRACTITIONER

## 2022-12-07 PROCEDURE — G8427 DOCREV CUR MEDS BY ELIG CLIN: HCPCS | Performed by: NURSE PRACTITIONER

## 2022-12-07 RX ORDER — CIPROFLOXACIN 250 MG/1
250 TABLET, FILM COATED ORAL 2 TIMES DAILY
Qty: 6 TABLET | Refills: 0 | Status: SHIPPED | OUTPATIENT
Start: 2022-12-07 | End: 2022-12-07

## 2022-12-07 RX ORDER — CIPROFLOXACIN 500 MG/1
500 TABLET, FILM COATED ORAL 2 TIMES DAILY
Qty: 10 TABLET | Refills: 0 | Status: SHIPPED | OUTPATIENT
Start: 2022-12-07 | End: 2022-12-12

## 2022-12-07 ASSESSMENT — ENCOUNTER SYMPTOMS
COUGH: 0
NAUSEA: 0
SHORTNESS OF BREATH: 0
ABDOMINAL PAIN: 0

## 2022-12-07 NOTE — PROGRESS NOTES
Moe Chavis (1990) 29 y.o. female here for evaluation of the following chief complaint(s):      HPI:  Chief Complaint   Patient presents with    Back Pain           Urinary Tract Infection     C/o Dysuria x 3 days. Denies urgency, frequency and hematuria    Foot Pain     Right      Fatigue     Recently started Keto diet       Mid back pain bilateral. Soreness in back. Denies pain wrapping around to front. Burning with urination. Denies frequency or urgency. Lower abdominal pressure and cramping at times. Recently started KETO DIET. Cut down significantly on sugars and switched from regular to diet pop. Complains of fatigue, achiness and not feeling well. Tired. Vitals:    12/07/22 1017   BP: 130/74   Pulse: 80   Resp: 16       Patient Active Problem List   Diagnosis    PCOS (polycystic ovarian syndrome)    Depression with anxiety    Bipolar disorder ([facility] [address]    Episode of recurrent major depressive disorder (HCC)    Affective psychosis, bipolar ([email or web address]    Major depressive disorder without psychotic features    Alcohol abuse    Obesity       SUBJECTIVE/OBJECTIVE:  Review of Systems   Constitutional:  Positive for activity change and fatigue. Negative for chills and fever. HENT: Negative. Respiratory:  Negative for cough and shortness of breath. Cardiovascular:  Negative for chest pain. Gastrointestinal:  Negative for abdominal pain and nausea. Genitourinary:  Positive for dysuria. Negative for enuresis, frequency, hematuria and urgency. Musculoskeletal:  Positive for myalgias. Skin:  Negative for rash. Neurological:  Negative for dizziness, light-headedness and headaches. Psychiatric/Behavioral: Negative. Physical Exam  Constitutional:       General: She is not in acute distress. Eyes:      Pupils: Pupils are equal, round, and reactive to light. Cardiovascular:      Rate and Rhythm: Normal rate and regular rhythm. Heart sounds: No murmur heard.   Pulmonary: Effort: Pulmonary effort is normal.      Breath sounds: Normal breath sounds. No wheezing. Abdominal:      General: Bowel sounds are normal. There is no distension. Palpations: Abdomen is soft. Tenderness: There is no abdominal tenderness. There is no right CVA tenderness or left CVA tenderness. Musculoskeletal:         General: Normal range of motion. Cervical back: Normal range of motion and neck supple. Thoracic back: Tenderness present. No bony tenderness. Lumbar back: Tenderness present. No bony tenderness. Skin:     General: Skin is warm and dry. Findings: No rash. ASSESSMENT/PLAN:   Diagnosis Orders   1. Dysuria  POCT Urinalysis No Micro (Auto)    Culture, Urine    Basic Metabolic Panel    ciprofloxacin (CIPRO) 500 MG tablet    DISCONTINUED: ciprofloxacin (CIPRO) 250 MG tablet      2. Flank pain  Basic Metabolic Panel    XR ABDOMEN (KUB) (SINGLE AP VIEW)    ciprofloxacin (CIPRO) 500 MG tablet      3. Other fatigue              MDM: UA: mod BLO   Unlikly HPI kidney stone? UTI with Keto FLU? Cover for UTI due to burning   Culture sent   BMP and KUB for completeness   Push fluids   Fatigue is direct result of diet bolden    - take up to 2-3 weeks to resolve   RTO in 1 month      An electronic signature was used to authenticate this note.     --Ed Oconnor, APRN - CNP

## 2022-12-07 NOTE — LETTER
[address] [id number]  Phone: [phone]  Fax: [phone]    JAMESON Gurrola CNP        December 7, 2022     Patient: Eldon Collins   YOB: 1990   Date of Visit: 12/7/2022       To Whom It May Concern: It is my medical opinion that [facility] may return to work on 12/12/22. If you have any questions or concerns, please don't hesitate to call.     Sincerely,        JAMESON Gurrola CNP

## 2022-12-09 LAB
ORGANISM: ABNORMAL
URINE CULTURE, ROUTINE: ABNORMAL

## 2023-01-19 ENCOUNTER — OFFICE VISIT (OUTPATIENT)
Dept: FAMILY MEDICINE CLINIC | Age: 33
End: 2023-01-19
Payer: MEDICAID

## 2023-01-19 VITALS
BODY MASS INDEX: 51.65 KG/M2 | SYSTOLIC BLOOD PRESSURE: 116 MMHG | HEART RATE: 104 BPM | RESPIRATION RATE: 16 BRPM | DIASTOLIC BLOOD PRESSURE: 70 MMHG | WEIGHT: 293 LBS

## 2023-01-19 DIAGNOSIS — F31.4 BIPOLAR DISORDER, CURRENT EPISODE DEPRESSED, SEVERE, WITHOUT PSYCHOTIC FEATURES (HCC): Primary | ICD-10-CM

## 2023-01-19 PROCEDURE — G8417 CALC BMI ABV UP PARAM F/U: HCPCS | Performed by: NURSE PRACTITIONER

## 2023-01-19 PROCEDURE — 99213 OFFICE O/P EST LOW 20 MIN: CPT | Performed by: NURSE PRACTITIONER

## 2023-01-19 PROCEDURE — G8484 FLU IMMUNIZE NO ADMIN: HCPCS | Performed by: NURSE PRACTITIONER

## 2023-01-19 PROCEDURE — G8427 DOCREV CUR MEDS BY ELIG CLIN: HCPCS | Performed by: NURSE PRACTITIONER

## 2023-01-19 PROCEDURE — 1036F TOBACCO NON-USER: CPT | Performed by: NURSE PRACTITIONER

## 2023-01-19 RX ORDER — PAROXETINE 10 MG/1
10 TABLET, FILM COATED ORAL DAILY
Qty: 90 TABLET | Refills: 1 | Status: SHIPPED | OUTPATIENT
Start: 2023-01-19

## 2023-01-19 ASSESSMENT — PATIENT HEALTH QUESTIONNAIRE - PHQ9
1. LITTLE INTEREST OR PLEASURE IN DOING THINGS: 3
9. THOUGHTS THAT YOU WOULD BE BETTER OFF DEAD, OR OF HURTING YOURSELF: 0
3. TROUBLE FALLING OR STAYING ASLEEP: 3
8. MOVING OR SPEAKING SO SLOWLY THAT OTHER PEOPLE COULD HAVE NOTICED. OR THE OPPOSITE, BEING SO FIGETY OR RESTLESS THAT YOU HAVE BEEN MOVING AROUND A LOT MORE THAN USUAL: 3
2. FEELING DOWN, DEPRESSED OR HOPELESS: 3
7. TROUBLE CONCENTRATING ON THINGS, SUCH AS READING THE NEWSPAPER OR WATCHING TELEVISION: 3
SUM OF ALL RESPONSES TO PHQ QUESTIONS 1-9: 21
4. FEELING TIRED OR HAVING LITTLE ENERGY: 3
SUM OF ALL RESPONSES TO PHQ QUESTIONS 1-9: 21
10. IF YOU CHECKED OFF ANY PROBLEMS, HOW DIFFICULT HAVE THESE PROBLEMS MADE IT FOR YOU TO DO YOUR WORK, TAKE CARE OF THINGS AT HOME, OR GET ALONG WITH OTHER PEOPLE: 1
5. POOR APPETITE OR OVEREATING: 3
6. FEELING BAD ABOUT YOURSELF - OR THAT YOU ARE A FAILURE OR HAVE LET YOURSELF OR YOUR FAMILY DOWN: 0
SUM OF ALL RESPONSES TO PHQ9 QUESTIONS 1 & 2: 6

## 2023-01-19 ASSESSMENT — COLUMBIA-SUICIDE SEVERITY RATING SCALE - C-SSRS
1. WITHIN THE PAST MONTH, HAVE YOU WISHED YOU WERE DEAD OR WISHED YOU COULD GO TO SLEEP AND NOT WAKE UP?: NO
2. HAVE YOU ACTUALLY HAD ANY THOUGHTS OF KILLING YOURSELF?: NO
6. HAVE YOU EVER DONE ANYTHING, STARTED TO DO ANYTHING, OR PREPARED TO DO ANYTHING TO END YOUR LIFE?: NO

## 2023-01-19 ASSESSMENT — ENCOUNTER SYMPTOMS
NAUSEA: 0
SHORTNESS OF BREATH: 0
COUGH: 0
ABDOMINAL PAIN: 0

## 2023-01-19 NOTE — PROGRESS NOTES
Ed Jessica (1990) 29 y.o. female here for evaluation of the following chief complaint(s):      HPI:  Chief Complaint   Patient presents with    Depression    Insomnia     Hx of Bipolar. Has to leave her job which she liked due to losing . Stress with kids. Oldest child has some behavior issues. Youngest child is 3year old and havgin some regression with sleep. Not sleeping well. Benefit in past with Seroquel and Paxil to calm her bipolar down. Level out her mood. Does not want seroquel due to fatigue. Vitals:    01/19/23 0858   BP: 116/70   Pulse: (!) 104   Resp: 16       Patient Active Problem List   Diagnosis    PCOS (polycystic ovarian syndrome)    Depression with anxiety    Bipolar disorder ([facility] [address]    Episode of recurrent major depressive disorder (HCC)    Affective psychosis, bipolar ([facility] [address]    Major depressive disorder without psychotic features    Alcohol abuse    Obesity       SUBJECTIVE/OBJECTIVE:  Review of Systems   Constitutional:  Negative for chills and fever. HENT: Negative. Respiratory:  Negative for cough and shortness of breath. Cardiovascular:  Negative for chest pain. Gastrointestinal:  Negative for abdominal pain and nausea. Skin:  Negative for rash. Neurological:  Negative for dizziness, light-headedness and headaches. Psychiatric/Behavioral:  Positive for dysphoric mood and sleep disturbance. The patient is nervous/anxious. Physical Exam  Constitutional:       General: She is not in acute distress. Appearance: She is not ill-appearing. Eyes:      Pupils: Pupils are equal, round, and reactive to light. Cardiovascular:      Rate and Rhythm: Normal rate and regular rhythm. Heart sounds: No murmur heard. Pulmonary:      Effort: Pulmonary effort is normal. No respiratory distress. Breath sounds: Normal breath sounds. No wheezing. Abdominal:      General: Bowel sounds are normal. There is no distension.       Palpations: Abdomen is soft. Tenderness: There is no abdominal tenderness. Musculoskeletal:         General: No tenderness. Normal range of motion. Cervical back: Normal range of motion and neck supple. Skin:     General: Skin is warm and dry. Findings: No rash. Neurological:      Mental Status: She is alert and oriented to person, place, and time. Psychiatric:         Attention and Perception: She is inattentive. Mood and Affect: Mood is anxious and depressed. Behavior: Behavior normal.         ASSESSMENT/PLAN:   Diagnosis Orders   1. Bipolar disorder, current episode depressed, severe, without psychotic features ([facility] [address]  PARoxetine (PAXIL) 10 MG tablet            MDM:  Restart Paxil  Non-pharm tx discussed  RTO in 1 month    An electronic signature was used to authenticate this note.     --JAMESON Bocanegra - CNP

## 2023-01-25 RX ORDER — NORELGESTROMIN AND ETHINYL ESTRADIOL 150; 35 UG/D; UG/D
1 PATCH TRANSDERMAL WEEKLY
Qty: 3 PATCH | Refills: 3 | Status: SHIPPED | OUTPATIENT
Start: 2023-01-25

## 2023-01-25 NOTE — TELEPHONE ENCOUNTER
The patient called in and is requesting a refill on her [place] to be sent to [address] [place] [address]. Order pended for your signature.       If no call back the patient will check with her pharmacy after 2pm

## 2023-03-01 ENCOUNTER — TELEPHONE (OUTPATIENT)
Dept: FAMILY MEDICINE CLINIC | Age: 33
End: 2023-03-01

## 2023-03-01 DIAGNOSIS — Z30.011 ORAL CONTRACEPTION INITIATION: Primary | ICD-10-CM

## 2023-03-01 RX ORDER — NORGESTIMATE AND ETHINYL ESTRADIOL 0.25-0.035
1 KIT ORAL DAILY
Qty: 28 TABLET | Refills: 5 | Status: SHIPPED | OUTPATIENT
Start: 2023-03-01 | End: 2023-03-01 | Stop reason: SDUPTHER

## 2023-03-01 RX ORDER — NORGESTIMATE AND ETHINYL ESTRADIOL 0.25-0.035
1 KIT ORAL DAILY
Qty: 28 TABLET | Refills: 5 | Status: SHIPPED | OUTPATIENT
Start: 2023-03-01

## 2023-03-01 NOTE — TELEPHONE ENCOUNTER
She asked for this to be sent to [facility], not [facility]. I phoned [facility] [phone], spoke with a female [facility] and [facility] rx has been canceled.

## 2023-03-01 NOTE — TELEPHONE ENCOUNTER
Pt called office stating she is having ongoing issues with [facility]. She has been getting her birth control patch from them the last 14months and every month there has been an issues with something. Either they don't have it in stock, theres an \"issue\" with insurance etc. Pt is just tired of it. So at this point she is 3days late starting her patch \"and I absolutely do not want to get pregnant\". Pt has an appt for a tubal ligation consult next week on Wednesday. Pt wants to make sure she does not get pregnant again before the TL can be done. Pt is asking to switch to a birth control pill and have this sent to [facility]. Pt will use condoms atleast this first month since she is starting the birth control late and also switching birth control types. If no call back she will check with [facility] after 1pm today. Please advise.

## 2023-03-21 DIAGNOSIS — F31.4 BIPOLAR DISORDER, CURRENT EPISODE DEPRESSED, SEVERE, WITHOUT PSYCHOTIC FEATURES (HCC): ICD-10-CM

## 2023-03-21 DIAGNOSIS — Z30.011 ORAL CONTRACEPTION INITIATION: ICD-10-CM

## 2023-03-21 RX ORDER — NORGESTIMATE AND ETHINYL ESTRADIOL 0.25-0.035
1 KIT ORAL DAILY
Qty: 3 PACKET | Refills: 3 | Status: SHIPPED | OUTPATIENT
Start: 2023-03-21

## 2023-03-21 RX ORDER — PAROXETINE 10 MG/1
10 TABLET, FILM COATED ORAL DAILY
Qty: 90 TABLET | Refills: 3 | Status: SHIPPED | OUTPATIENT
Start: 2023-03-21

## 2023-03-21 NOTE — TELEPHONE ENCOUNTER
We received a fax from [facility] requesting a refill of Sprintec and Paroxetine. Refill if appropriate.

## 2023-10-27 ENCOUNTER — NURSE ONLY (OUTPATIENT)
Dept: LAB | Age: 33
End: 2023-10-27

## 2023-10-27 DIAGNOSIS — E66.01 MORBID OBESITY WITH BMI OF 50.0-59.9, ADULT (HCC): ICD-10-CM

## 2023-10-27 DIAGNOSIS — E66.01 MORBID OBESITY WITH BMI OF 50.0-59.9, ADULT (HCC): Primary | ICD-10-CM

## 2023-10-27 LAB
ANION GAP SERPL CALC-SCNC: 12 MEQ/L (ref 8–16)
BUN SERPL-MCNC: 10 MG/DL (ref 7–22)
CALCIUM SERPL-MCNC: 8.9 MG/DL (ref 8.5–10.5)
CHLORIDE SERPL-SCNC: 107 MEQ/L (ref 98–111)
CHOLEST SERPL-MCNC: 176 MG/DL (ref 100–199)
CO2 SERPL-SCNC: 23 MEQ/L (ref 23–33)
CREAT SERPL-MCNC: 0.7 MG/DL (ref 0.4–1.2)
DEPRECATED MEAN GLUCOSE BLD GHB EST-ACNC: 93 MG/DL (ref 70–126)
GFR SERPL CREATININE-BSD FRML MDRD: > 60 ML/MIN/1.73M2
GLUCOSE SERPL-MCNC: 95 MG/DL (ref 70–108)
HBA1C MFR BLD HPLC: 5.1 % (ref 4.4–6.4)
HDLC SERPL-MCNC: 51 MG/DL
LDLC SERPL CALC-MCNC: 105 MG/DL
POTASSIUM SERPL-SCNC: 4 MEQ/L (ref 3.5–5.2)
SODIUM SERPL-SCNC: 142 MEQ/L (ref 135–145)
TRIGL SERPL-MCNC: 98 MG/DL (ref 0–199)

## 2024-01-01 DIAGNOSIS — Z30.011 ORAL CONTRACEPTION INITIATION: ICD-10-CM

## 2024-01-03 ENCOUNTER — TELEMEDICINE (OUTPATIENT)
Dept: FAMILY MEDICINE CLINIC | Age: 34
End: 2024-01-03
Payer: MEDICAID

## 2024-01-03 DIAGNOSIS — R53.83 OTHER FATIGUE: ICD-10-CM

## 2024-01-03 DIAGNOSIS — U07.1 PNEUMONIA DUE TO COVID-19 VIRUS: Primary | ICD-10-CM

## 2024-01-03 DIAGNOSIS — J12.82 PNEUMONIA DUE TO COVID-19 VIRUS: Primary | ICD-10-CM

## 2024-01-03 PROCEDURE — 99214 OFFICE O/P EST MOD 30 MIN: CPT | Performed by: NURSE PRACTITIONER

## 2024-01-03 PROCEDURE — G8427 DOCREV CUR MEDS BY ELIG CLIN: HCPCS | Performed by: NURSE PRACTITIONER

## 2024-01-03 RX ORDER — AZITHROMYCIN 250 MG/1
TABLET, FILM COATED ORAL
Qty: 6 TABLET | Refills: 0 | Status: SHIPPED | OUTPATIENT
Start: 2024-01-03 | End: 2024-01-13

## 2024-01-03 RX ORDER — DEXAMETHASONE 6 MG/1
6 TABLET ORAL 2 TIMES DAILY WITH MEALS
Qty: 10 TABLET | Refills: 0 | Status: SHIPPED | OUTPATIENT
Start: 2024-01-03 | End: 2024-01-08

## 2024-01-03 ASSESSMENT — ENCOUNTER SYMPTOMS
RHINORRHEA: 1
SHORTNESS OF BREATH: 1
NAUSEA: 0
CHEST TIGHTNESS: 1
COUGH: 1
ABDOMINAL PAIN: 0

## 2024-01-03 NOTE — PROGRESS NOTES
Subjective:      Patient ID: Marlin Davis is a 33 y.o. female    HPI: Acute for COVID    TELEHEALTH EVALUATION -- Audio/Visual     Patient identification was verified at the start of the visit: Yes    Services were provided through a video synchronous discussion virtually to substitute for in-person clinic visit. Patient and provider were located at their individual homes.    Patient has requested an audio/video evaluation for the following concern(s):    Chief Complaint   Patient presents with    Positive For Covid-19       Onset of 12/24/23 with URI symptoms - ST, fever, fatigue.   COVID POS on 12/26/24    Continues with symptoms - fatigue, productive cough, chest congestion and chest heaviness.  No fevers.   Exhaustion is biggest issue.  Wants to sleep all the time.     Patient Active Problem List   Diagnosis    PCOS (polycystic ovarian syndrome)    Depression with anxiety    Bipolar disorder (HCC)    Episode of recurrent major depressive disorder (HCC)    Affective psychosis, bipolar (HCC)    Major depressive disorder without psychotic features    Alcohol abuse    Obesity       Review of Systems   Constitutional:  Positive for fatigue. Negative for chills and fever.   HENT:  Positive for congestion, postnasal drip and rhinorrhea.    Respiratory:  Positive for cough, chest tightness and shortness of breath.    Cardiovascular:  Negative for chest pain.   Gastrointestinal:  Negative for abdominal pain and nausea.   Skin:  Negative for rash.   Neurological:  Negative for dizziness, light-headedness and headaches.   Psychiatric/Behavioral: Negative.         Objective:   Physical Exam  Constitutional:       General: She is not in acute distress.     Appearance: She is ill-appearing.   Pulmonary:      Effort: Pulmonary effort is normal. No respiratory distress.   Neurological:      Mental Status: She is alert and oriented to person, place, and time.   Psychiatric:         Mood and Affect: Mood normal.

## 2024-01-09 ENCOUNTER — TELEPHONE (OUTPATIENT)
Dept: FAMILY MEDICINE CLINIC | Age: 34
End: 2024-01-09

## 2024-01-09 DIAGNOSIS — U07.1 PNEUMONIA DUE TO COVID-19 VIRUS: Primary | ICD-10-CM

## 2024-01-09 DIAGNOSIS — J12.82 PNEUMONIA DUE TO COVID-19 VIRUS: Primary | ICD-10-CM

## 2024-01-09 NOTE — TELEPHONE ENCOUNTER
The patient called in and stated that she had a VV on 1/3 and was DX with Covid Pneumonia.  She states that she has finished her ATB and steroid and continues to have the extreme fatigue.  She is still coughing and her chest feels heavy at times and \"feels like it is filling up\".  She is SOB with activity and wakes up 2-3 times a night sweating and clammy.  She uses [facility].  Please advise.        The patient is requesting a call back

## 2024-01-09 NOTE — TELEPHONE ENCOUNTER
Called and updated the patient, she is requesting the orders be faxed to [facility] and she will go there for testing.  Orders faxed to [facility] at [phone].

## 2024-01-10 NOTE — TELEPHONE ENCOUNTER
Patient called and stated that she going to [facility] today to have the lab work and chest xray completed.   She stated that she is to return to work on 1/16/24 however she is still not feeling well.   She was wanting to know if you would extended her time off work. She stated that she would like an additional 2 weeks off so she can rest and get better.   2 weeks would be return to work on 1/29/2024.     We scheduled her sons for an appointment tomorrow as they are sick also. She stated that she can  the work release at that time.

## 2024-01-11 LAB
BUN BLDV-MCNC: 19 MG/DL
CALCIUM SERPL-MCNC: 8.4 MG/DL
CHLORIDE BLD-SCNC: 101 MMOL/L
CO2: 25 MMOL/L
CREAT SERPL-MCNC: 0.6 MG/DL
EGFR: >=90
GLUCOSE BLD-MCNC: 86 MG/DL
POTASSIUM SERPL-SCNC: 4.4 MMOL/L
SODIUM BLD-SCNC: 135 MMOL/L

## 2024-01-12 ENCOUNTER — TELEPHONE (OUTPATIENT)
Dept: FAMILY MEDICINE CLINIC | Age: 34
End: 2024-01-12

## 2024-01-12 NOTE — TELEPHONE ENCOUNTER
Labs received and reviewed.  Unremarkable.  Still awaiting CXR - please check prior to closing if received have scanned into chart so I can review and give patient a call.

## 2024-01-12 NOTE — TELEPHONE ENCOUNTER
Patient calling for CXR and lab results she had done at [facility] yesterday.  CXR in media.  Called [facility] for lab results and spoke with medical records Jean Paul.  She will fax labs results.  Please advise

## 2024-01-18 ENCOUNTER — TELEPHONE (OUTPATIENT)
Dept: FAMILY MEDICINE CLINIC | Age: 34
End: 2024-01-18

## 2024-01-18 NOTE — TELEPHONE ENCOUNTER
Pt called office stating her employer is faxing over Short Term Disability paperwork this morning. This is for pt to be off work 1/1/2024-1/22/2024 through [employer]. Pt would like a call back once the paperwork is complete. Please advise.

## 2024-02-12 DIAGNOSIS — Z30.011 ORAL CONTRACEPTION INITIATION: ICD-10-CM

## 2024-02-12 RX ORDER — NORGESTIMATE AND ETHINYL ESTRADIOL 0.25-0.035
1 KIT ORAL DAILY
Qty: 28 TABLET | Refills: 0 | Status: SHIPPED | OUTPATIENT
Start: 2024-02-12

## 2024-02-12 NOTE — TELEPHONE ENCOUNTER
Patient waiting on Rx for Sprintec from [facility].  Requesting 1 month to [facility].  Will check with pharmacy after 1pm.  Please refill if appropriate

## 2025-04-02 ENCOUNTER — TELEMEDICINE (OUTPATIENT)
Dept: FAMILY MEDICINE CLINIC | Age: 35
End: 2025-04-02

## 2025-04-02 DIAGNOSIS — R59.0 OCCIPITAL LYMPHADENOPATHY: ICD-10-CM

## 2025-04-02 DIAGNOSIS — H92.01 RIGHT EAR PAIN: Primary | ICD-10-CM

## 2025-04-02 DIAGNOSIS — R42 VERTIGO: ICD-10-CM

## 2025-04-02 DIAGNOSIS — R11.0 NAUSEA: ICD-10-CM

## 2025-04-02 PROCEDURE — G2211 COMPLEX E/M VISIT ADD ON: HCPCS | Performed by: NURSE PRACTITIONER

## 2025-04-02 PROCEDURE — 99213 OFFICE O/P EST LOW 20 MIN: CPT | Performed by: NURSE PRACTITIONER

## 2025-04-02 RX ORDER — PROMETHAZINE HYDROCHLORIDE 25 MG/1
25 TABLET ORAL EVERY 8 HOURS PRN
Qty: 12 TABLET | Refills: 0 | Status: SHIPPED | OUTPATIENT
Start: 2025-04-02 | End: 2025-04-06

## 2025-04-02 RX ORDER — MECLIZINE HYDROCHLORIDE 25 MG/1
25 TABLET ORAL 3 TIMES DAILY PRN
Qty: 15 TABLET | Refills: 0 | Status: SHIPPED | OUTPATIENT
Start: 2025-04-02 | End: 2025-04-07

## 2025-04-02 ASSESSMENT — ENCOUNTER SYMPTOMS
SHORTNESS OF BREATH: 0
ABDOMINAL PAIN: 0
COUGH: 0
VOMITING: 1
NAUSEA: 1

## 2025-04-02 NOTE — PROGRESS NOTES
Subjective:      Patient ID: Marlin Davis is a 34 y.o. female    HPI: Acute for N/V    TELEHEALTH EVALUATION -- Audio/Visual     Patient identification was verified at the start of the visit: Yes    Services were provided through a video synchronous discussion virtually to substitute for in-person clinic visit. Patient and provider were located at their individual homes.    Patient has requested an audio/video evaluation for the following concern(s):    Chief Complaint   Patient presents with    Nausea       Onset of 1 days with N/V.  Dizziness with standing causing the N/V.   Denies diarrhea or stomach pains.   Right ear pressure and dull throbbing pain.  Occipital adenopathy on right side.  Denies sinus congestion or pressure.     Patient Active Problem List   Diagnosis    PCOS (polycystic ovarian syndrome)    Depression with anxiety    Bipolar disorder    Episode of recurrent major depressive disorder    Affective psychosis, bipolar (HCC)    Major depressive disorder without psychotic features    Alcohol abuse    Obesity       Review of Systems   Constitutional:  Positive for activity change, appetite change and fatigue. Negative for chills and fever.   HENT:  Positive for ear pain and hearing loss.    Respiratory:  Negative for cough and shortness of breath.    Cardiovascular:  Negative for chest pain.   Gastrointestinal:  Positive for nausea and vomiting. Negative for abdominal pain.   Skin:  Negative for rash.   Neurological:  Positive for dizziness. Negative for light-headedness and headaches.   Hematological:  Positive for adenopathy.   Psychiatric/Behavioral: Negative.         Objective:   Physical Exam  Constitutional:       General: She is not in acute distress.     Appearance: She is not ill-appearing.   Pulmonary:      Effort: Pulmonary effort is normal. No respiratory distress.   Neurological:      Mental Status: She is alert and oriented to person, place, and time.   Psychiatric:         Mood and

## 2025-04-07 ENCOUNTER — OFFICE VISIT (OUTPATIENT)
Dept: FAMILY MEDICINE CLINIC | Age: 35
End: 2025-04-07
Payer: COMMERCIAL

## 2025-04-07 ENCOUNTER — TELEPHONE (OUTPATIENT)
Dept: FAMILY MEDICINE CLINIC | Age: 35
End: 2025-04-07

## 2025-04-07 VITALS
HEIGHT: 64 IN | HEART RATE: 76 BPM | RESPIRATION RATE: 18 BRPM | TEMPERATURE: 97.9 F | SYSTOLIC BLOOD PRESSURE: 110 MMHG | DIASTOLIC BLOOD PRESSURE: 72 MMHG | WEIGHT: 249.2 LBS | BODY MASS INDEX: 42.55 KG/M2

## 2025-04-07 DIAGNOSIS — R11.0 NAUSEA: ICD-10-CM

## 2025-04-07 DIAGNOSIS — J32.0 RIGHT MAXILLARY SINUSITIS: Primary | ICD-10-CM

## 2025-04-07 PROCEDURE — 99214 OFFICE O/P EST MOD 30 MIN: CPT | Performed by: NURSE PRACTITIONER

## 2025-04-07 PROCEDURE — G2211 COMPLEX E/M VISIT ADD ON: HCPCS | Performed by: NURSE PRACTITIONER

## 2025-04-07 RX ORDER — OMEPRAZOLE 40 MG/1
40 CAPSULE, DELAYED RELEASE ORAL
Qty: 14 CAPSULE | Refills: 0 | Status: SHIPPED | OUTPATIENT
Start: 2025-04-07 | End: 2025-04-21

## 2025-04-07 RX ORDER — PSEUDOEPHEDRINE HCL 120 MG/1
120 TABLET, FILM COATED, EXTENDED RELEASE ORAL EVERY 12 HOURS
Qty: 14 TABLET | Refills: 0 | Status: SHIPPED | OUTPATIENT
Start: 2025-04-07 | End: 2025-04-14

## 2025-04-07 RX ORDER — PROMETHAZINE HYDROCHLORIDE 25 MG/1
25 TABLET ORAL EVERY 8 HOURS PRN
Qty: 12 TABLET | Refills: 0 | Status: SHIPPED | OUTPATIENT
Start: 2025-04-07 | End: 2025-04-11

## 2025-04-07 RX ORDER — DOXYCYCLINE HYCLATE 100 MG
100 TABLET ORAL 2 TIMES DAILY
Qty: 14 TABLET | Refills: 0 | Status: SHIPPED | OUTPATIENT
Start: 2025-04-07 | End: 2025-04-14

## 2025-04-07 SDOH — ECONOMIC STABILITY: FOOD INSECURITY: WITHIN THE PAST 12 MONTHS, THE FOOD YOU BOUGHT JUST DIDN'T LAST AND YOU DIDN'T HAVE MONEY TO GET MORE.: NEVER TRUE

## 2025-04-07 SDOH — ECONOMIC STABILITY: FOOD INSECURITY: WITHIN THE PAST 12 MONTHS, YOU WORRIED THAT YOUR FOOD WOULD RUN OUT BEFORE YOU GOT MONEY TO BUY MORE.: NEVER TRUE

## 2025-04-07 ASSESSMENT — PATIENT HEALTH QUESTIONNAIRE - PHQ9
1. LITTLE INTEREST OR PLEASURE IN DOING THINGS: NOT AT ALL
4. FEELING TIRED OR HAVING LITTLE ENERGY: NOT AT ALL
5. POOR APPETITE OR OVEREATING: NOT AT ALL
SUM OF ALL RESPONSES TO PHQ QUESTIONS 1-9: 3
7. TROUBLE CONCENTRATING ON THINGS, SUCH AS READING THE NEWSPAPER OR WATCHING TELEVISION: NOT AT ALL
8. MOVING OR SPEAKING SO SLOWLY THAT OTHER PEOPLE COULD HAVE NOTICED. OR THE OPPOSITE, BEING SO FIGETY OR RESTLESS THAT YOU HAVE BEEN MOVING AROUND A LOT MORE THAN USUAL: NOT AT ALL
SUM OF ALL RESPONSES TO PHQ QUESTIONS 1-9: 3
SUM OF ALL RESPONSES TO PHQ QUESTIONS 1-9: 3
3. TROUBLE FALLING OR STAYING ASLEEP: NEARLY EVERY DAY
6. FEELING BAD ABOUT YOURSELF - OR THAT YOU ARE A FAILURE OR HAVE LET YOURSELF OR YOUR FAMILY DOWN: NOT AT ALL
10. IF YOU CHECKED OFF ANY PROBLEMS, HOW DIFFICULT HAVE THESE PROBLEMS MADE IT FOR YOU TO DO YOUR WORK, TAKE CARE OF THINGS AT HOME, OR GET ALONG WITH OTHER PEOPLE: NOT DIFFICULT AT ALL
9. THOUGHTS THAT YOU WOULD BE BETTER OFF DEAD, OR OF HURTING YOURSELF: NOT AT ALL
2. FEELING DOWN, DEPRESSED OR HOPELESS: NOT AT ALL
SUM OF ALL RESPONSES TO PHQ QUESTIONS 1-9: 3

## 2025-04-07 ASSESSMENT — ENCOUNTER SYMPTOMS
NAUSEA: 1
VOMITING: 1
SINUS PRESSURE: 1
COUGH: 0
ABDOMINAL PAIN: 1
SHORTNESS OF BREATH: 0

## 2025-04-07 NOTE — TELEPHONE ENCOUNTER
Patient was here for an appointment today and [facility] paperwork was received.   Patient would like a call once paperwork is faxed.

## 2025-04-07 NOTE — TELEPHONE ENCOUNTER
Pt called office stating she had a VV last week with you and she is no better. Yesterday she vomited off and on all day. Laid in bed all day. No appetite. \"Really bad stomach pains\". Pt requested an appt in office today, appt scheduled.

## 2025-04-07 NOTE — PROGRESS NOTES
Negative.         Physical Exam  Constitutional:       General: She is not in acute distress.  HENT:      Right Ear: Tympanic membrane is not erythematous or bulging.      Nose:      Right Turbinates: Not swollen.      Left Turbinates: Not swollen.      Right Sinus: Frontal sinus tenderness present.      Mouth/Throat:      Pharynx: Postnasal drip present. No pharyngeal swelling or posterior oropharyngeal erythema.   Eyes:      Pupils: Pupils are equal, round, and reactive to light.   Cardiovascular:      Rate and Rhythm: Normal rate and regular rhythm.      Heart sounds: No murmur heard.  Pulmonary:      Effort: Pulmonary effort is normal.      Breath sounds: Normal breath sounds. No wheezing.   Abdominal:      General: Bowel sounds are normal. There is no distension.      Palpations: Abdomen is soft.      Tenderness: There is abdominal tenderness in the right upper quadrant and epigastric area. There is no guarding or rebound. Negative signs include Estrada's sign.   Musculoskeletal:         General: No tenderness. Normal range of motion.      Cervical back: Normal range of motion and neck supple.   Skin:     General: Skin is warm and dry.      Findings: No rash.           ASSESSMENT/PLAN:   Diagnosis Orders   1. Right maxillary sinusitis  doxycycline hyclate (VIBRA-TABS) 100 MG tablet    pseudoephedrine (SUDAFED 12 HR) 120 MG extended release tablet      2. Nausea  promethazine (PHENERGAN) 25 MG tablet    omeprazole (PRILOSEC) 40 MG delayed release capsule            MDM:  Sinus in combination with now gastritis  Doxycycline BID for sinus  Sudafed for symptoms management of ear and head pressure  Phenergan PRN for nausea  PPI x 2 weeks to heal stomach  Rehydration stressed, bland diet  RTW 4/10/25  RTO PRN    An electronic signature was used to authenticate this note.    --Amador Monte, APRN - CNP